# Patient Record
Sex: MALE | Race: WHITE | NOT HISPANIC OR LATINO | Employment: FULL TIME | URBAN - NONMETROPOLITAN AREA
[De-identification: names, ages, dates, MRNs, and addresses within clinical notes are randomized per-mention and may not be internally consistent; named-entity substitution may affect disease eponyms.]

---

## 2017-07-14 ENCOUNTER — OFFICE VISIT (OUTPATIENT)
Dept: FAMILY MEDICINE | Facility: OTHER | Age: 27
End: 2017-07-14
Attending: FAMILY MEDICINE
Payer: COMMERCIAL

## 2017-07-14 VITALS
TEMPERATURE: 96.9 F | BODY MASS INDEX: 33.92 KG/M2 | DIASTOLIC BLOOD PRESSURE: 86 MMHG | HEIGHT: 69 IN | SYSTOLIC BLOOD PRESSURE: 138 MMHG | HEART RATE: 68 BPM | WEIGHT: 229 LBS

## 2017-07-14 DIAGNOSIS — Z13.1 SCREENING FOR DIABETES MELLITUS: ICD-10-CM

## 2017-07-14 DIAGNOSIS — Z23 NEED FOR VACCINATION: Primary | ICD-10-CM

## 2017-07-14 DIAGNOSIS — Z13.220 LIPID SCREENING: ICD-10-CM

## 2017-07-14 DIAGNOSIS — Z71.89 ACP (ADVANCE CARE PLANNING): Chronic | ICD-10-CM

## 2017-07-14 DIAGNOSIS — Z00.00 ROUTINE GENERAL MEDICAL EXAMINATION AT A HEALTH CARE FACILITY: ICD-10-CM

## 2017-07-14 PROCEDURE — 90715 TDAP VACCINE 7 YRS/> IM: CPT | Performed by: FAMILY MEDICINE

## 2017-07-14 PROCEDURE — 99395 PREV VISIT EST AGE 18-39: CPT | Mod: 25 | Performed by: FAMILY MEDICINE

## 2017-07-14 PROCEDURE — 90471 IMMUNIZATION ADMIN: CPT | Performed by: FAMILY MEDICINE

## 2017-07-14 ASSESSMENT — PAIN SCALES - GENERAL: PAINLEVEL: NO PAIN (0)

## 2017-07-14 ASSESSMENT — ANXIETY QUESTIONNAIRES
5. BEING SO RESTLESS THAT IT IS HARD TO SIT STILL: NOT AT ALL
7. FEELING AFRAID AS IF SOMETHING AWFUL MIGHT HAPPEN: NOT AT ALL
2. NOT BEING ABLE TO STOP OR CONTROL WORRYING: NOT AT ALL
6. BECOMING EASILY ANNOYED OR IRRITABLE: NOT AT ALL
4. TROUBLE RELAXING: NOT AT ALL
1. FEELING NERVOUS, ANXIOUS, OR ON EDGE: NOT AT ALL
3. WORRYING TOO MUCH ABOUT DIFFERENT THINGS: NOT AT ALL
GAD7 TOTAL SCORE: 0

## 2017-07-14 NOTE — MR AVS SNAPSHOT
After Visit Summary   7/14/2017    Juan Blake    MRN: [id number]           Patient Information     Date Of Birth          1990        Visit Information        Provider Department      7/14/2017 9:15 AM MAURA Chan MD [facility]        Today's Diagnoses     Need for vaccination    -  1    ACP (advance care planning)        Lipid screening        Screening for diabetes mellitus        Routine general medical examination at a health care facility          Care Instructions    We will call with the labs.            Follow-ups after your visit        Your next 10 appointments already scheduled     Jul 14, 2017  9:15 AM CDT   (Arrive by 9:00 AM)   Office Visit with MAURA Chan MD   [facility] ([facility] )    [address]  [facility] [address]   [phone]           Bring a current list of meds and any records pertaining to this visit.  For Physicals, please bring immunization records and any forms needing to be filled out.    Please arrive 15 minutes early to complete paperwork and register.              Future tests that were ordered for you today     Open Future Orders        Priority Expected Expires Ordered    Lipid Profile Routine 7/24/2017 12/14/2017 7/14/2017    Glucose Routine 7/24/2017 12/14/2017 7/14/2017    CBC with platelets differential Routine 7/24/2017 12/14/2017 7/14/2017            Who to contact     If you have questions or need follow up information about today's clinic visit or your schedule please contact [facility] directly at [phone].  Normal or non-critical lab and imaging results will be communicated to you by MyChart, letter or phone within 4 business days after the clinic has received the results. If you do not hear from us within 7 days, please contact the clinic through MyChart or phone. If you have a critical or abnormal lab result, we will notify you by phone as soon as possible.  Submit  "refill requests through [employer] or call your pharmacy and they will forward the refill request to us. Please allow 3 business days for your refill to be completed.          Additional Information About Your Visit        [employer] Information     W[employer] lets you send messages to your doctor, view your test results, renew your prescriptions, schedule appointments and more. To sign up, go to [email or web address] . Click on \"Log in\" on the left side of the screen, which will take you to the Welcome page. Then click on \"Sign up Now\" on the right side of the page.     You will be asked to enter the access code listed below, as well as some personal information. Please follow the directions to create your username and password.     Your access code is: [email or web address]  Expires: 10/12/2017  9:12 AM     Your access code will  in 90 days. If you need help or a new code, please call your [place] clinic or [phone].        Care EveryWhere ID     This is your Care EveryWhere ID. This could be used by other organizations to access your [place] medical records  [id number]        Your Vitals Were     Pulse Temperature Height BMI (Body Mass Index)          68 96.9  F (36.1  C) 5' 9.25\" (1.759 m) 33.57 kg/m2         Blood Pressure from Last 3 Encounters:   17 138/86   02/24/15 122/68   02/17/15 142/89    Weight from Last 3 Encounters:   17 229 lb (103.9 kg)   02/24/15 208 lb (94.3 kg)   02/16/15 208 lb (94.3 kg)              We Performed the Following     1st  Administration  [57957]     TDAP VACCINE (ADACEL) [15115.002]          Today's Medication Changes          These changes are accurate as of: 17  9:13 AM.  If you have any questions, ask your nurse or doctor.               Stop taking these medicines if you haven't already. Please contact your care team if you have questions.     NO ACTIVE MEDICATIONS   Stopped by:  MAURA Chan MD                    Primary Care Provider Office Phone # Fax #    " Juan Gomez,  [phone]       [facility] [address]  [facility] [address]        Equal Access to Services     ROSAS MATT : Hadii aad ku hadwillianrodrigo Soliz, wamarycarmenda luqadaha, qasheldonta kaalmada kaur, reji torreyin hayaadahlia garridoashantijack vitale. So [facility] [phone].    ATENCIÓN: Si habla español, tiene a hook disposición servicios gratuitos de asistencia lingüística. Llame al [phone].    We comply with applicable federal civil rights laws and [place] laws. We do not discriminate on the basis of race, color, national origin, age, disability sex, sexual orientation or gender identity.            Thank you!     Thank you for choosing [facility]  for your care. Our goal is always to provide you with excellent care. Hearing back from our patients is one way we can continue to improve our services. Please take a few minutes to complete the written survey that you may receive in the mail after your visit with us. Thank you!             Your Updated Medication List - Protect others around you: Learn how to safely use, store and throw away your medicines at www.disposemymeds.org.          This list is accurate as of: 7/14/17  9:13 AM.  Always use your most recent med list.                   Brand Name Dispense Instructions for use Diagnosis    IBUPROFEN PO      Take 800 mg by mouth every 8 hours as needed for moderate pain

## 2017-07-14 NOTE — PROGRESS NOTES
SUBJECTIVE:                                                    Juan Blake is a 26 year old male who presents to clinic today for the following health issues:    New Patient/Transfer of Care    Gastrointestinal symptoms      Duration: 2 years    Description:           REFLUX SYMPTOMS - heartburn    Intensity:  moderate    Accompanying signs and symptoms:  none    History  Previous {similar problem: no   Previous evaluation:  none    Aggravating factors: spicy foods, coffee    Alleviating factors: avoidance of caffeine and antacids    Other Therapies tried: zantac and tums    Groin pain      Duration: 1 month    Description (location/character/radiation): right side    Intensity:  moderate    Accompanying signs and symptoms: none    History (similar episodes/previous evaluation): None    Precipitating or alleviating factors: running, exercise, basketball    Therapies tried and outcome: None           Problem list and histories reviewed & adjusted, as indicated.  Additional history: as documented    Patient Active Problem List   Diagnosis     Facial trauma     Nasal fracture     ACP (advance care planning)     Past Surgical History:   Procedure Laterality Date     C ORAL SURGERY PROCEDURE      wisdom teeth     CLOSED REDUCTION NOSE N/A 2/17/2015    Procedure: CLOSED REDUCTION NOSE;  Surgeon: Yany Storey MD;  Location: [facility] OR       Social History   Substance Use Topics     Smoking status: Never Smoker     Smokeless tobacco: Never Used     Alcohol use 7.2 oz/week     12 Cans of beer per week      Comment: socially     Family History   Problem Relation Age of Onset     DIABETES Maternal Grandfather      Irritable Bowel Syndrome Mother      Alcoholism Brother          Current Outpatient Prescriptions   Medication Sig Dispense Refill     IBUPROFEN PO Take 800 mg by mouth every 8 hours as needed for moderate pain       Allergies   Allergen Reactions     Rad Marksd [Loracarbef]      Penicillins  "       ROS:  Constitutional, HEENT, cardiovascular, pulmonary, GI, , musculoskeletal, neuro, skin, endocrine and psych systems are negative, except as otherwise noted.    OBJECTIVE:     /86  Pulse 68  Temp 96.9  F (36.1  C)  Ht 5' 9.25\" (1.759 m)  Wt 229 lb (103.9 kg)  BMI 33.57 kg/m2  Body mass index is 33.57 kg/(m^2).  GENERAL: healthy, alert and no distress  EYES: Eyes grossly normal to inspection, PERRL and conjunctivae and sclerae normal  HENT: ear canals and TM's normal, nose and mouth without ulcers or lesions  NECK: no adenopathy, no asymmetry, masses, or scars and thyroid normal to palpation  RESP: lungs clear to auscultation - no rales, rhonchi or wheezes  CV: regular rate and rhythm, normal S1 S2, no S3 or S4, no murmur, click or rub, no peripheral edema and peripheral pulses strong  ABDOMEN: soft, nontender, no hepatosplenomegaly, no masses and bowel sounds normal  : normal testicles normal penis, no  inguinal adenopathy  MS: no gross musculoskeletal defects noted, no edema  SKIN: no suspicious lesions or rashes  NEURO: Normal strength and tone, mentation intact and speech normal  PSYCH: mentation appears normal, affect normal/bright    Diagnostic Test Results:  none     ASSESSMENT/PLAN:               ICD-10-CM    1. Need for vaccination Z23 TDAP VACCINE (ADACEL) [01570.002]     1st  Administration  [48569]   2. ACP (advance care planning) Z71.89    3. Lipid screening Z13.220 Lipid Profile, glucose CBC will be done the week of July 24.  Discussed diet and exercise for weight reduction. Had some reflux symptoms.  Discussed dietary changes.  Follow up as needed.  Also probably has a right groin strain no evidence of hernia.  He states is actually feeling better now.  He will try to properly stretch prior to doing activ   4. Screening for diabetes mellitus Z13.1 Glucose   5. Routine general medical examination at a health care facility Z00.00 CBC with platelets differential           R Sidney " MD Rocio  [facility]

## 2017-07-14 NOTE — NURSING NOTE
"Chief Complaint   Patient presents with     Establish Care       Initial /86  Pulse 68  Temp 96.9  F (36.1  C)  Ht 5' 9.25\" (1.759 m)  Wt 229 lb (103.9 kg)  BMI 33.57 kg/m2 Estimated body mass index is 33.57 kg/(m^2) as calculated from the following:    Height as of this encounter: 5' 9.25\" (1.759 m).    Weight as of this encounter: 229 lb (103.9 kg).  Medication Reconciliation: complete   Cass Craig    "

## 2017-07-15 ASSESSMENT — PATIENT HEALTH QUESTIONNAIRE - PHQ9: SUM OF ALL RESPONSES TO PHQ QUESTIONS 1-9: 0

## 2017-07-15 ASSESSMENT — ANXIETY QUESTIONNAIRES: GAD7 TOTAL SCORE: 0

## 2017-07-24 DIAGNOSIS — Z13.220 LIPID SCREENING: ICD-10-CM

## 2017-07-24 DIAGNOSIS — Z00.00 ROUTINE GENERAL MEDICAL EXAMINATION AT A HEALTH CARE FACILITY: ICD-10-CM

## 2017-07-24 DIAGNOSIS — Z13.1 SCREENING FOR DIABETES MELLITUS: ICD-10-CM

## 2017-07-24 LAB
BASOPHILS # BLD AUTO: 0.1 10E9/L (ref 0–0.2)
BASOPHILS NFR BLD AUTO: 0.6 %
CHOLEST SERPL-MCNC: 240 MG/DL
DIFFERENTIAL METHOD BLD: ABNORMAL
EOSINOPHIL # BLD AUTO: 0.3 10E9/L (ref 0–0.7)
EOSINOPHIL NFR BLD AUTO: 4.1 %
ERYTHROCYTE [DISTWIDTH] IN BLOOD BY AUTOMATED COUNT: 12 % (ref 10–15)
GLUCOSE SERPL-MCNC: 97 MG/DL (ref 70–99)
HCT VFR BLD AUTO: 43.1 % (ref 40–53)
HDLC SERPL-MCNC: 36 MG/DL
HGB BLD-MCNC: 15.7 G/DL (ref 13.3–17.7)
IMM GRANULOCYTES # BLD: 0 10E9/L (ref 0–0.4)
IMM GRANULOCYTES NFR BLD: 0.4 %
LDLC SERPL CALC-MCNC: 151 MG/DL
LYMPHOCYTES # BLD AUTO: 3.4 10E9/L (ref 0.8–5.3)
LYMPHOCYTES NFR BLD AUTO: 43.8 %
MCH RBC QN AUTO: 33.1 PG (ref 26.5–33)
MCHC RBC AUTO-ENTMCNC: 36.4 G/DL (ref 31.5–36.5)
MCV RBC AUTO: 91 FL (ref 78–100)
MONOCYTES # BLD AUTO: 0.5 10E9/L (ref 0–1.3)
MONOCYTES NFR BLD AUTO: 6.5 %
NEUTROPHILS # BLD AUTO: 3.4 10E9/L (ref 1.6–8.3)
NEUTROPHILS NFR BLD AUTO: 44.6 %
NONHDLC SERPL-MCNC: 204 MG/DL
NRBC # BLD AUTO: 0 10*3/UL
NRBC BLD AUTO-RTO: 0 /100
PLATELET # BLD AUTO: 192 10E9/L (ref 150–450)
RBC # BLD AUTO: 4.74 10E12/L (ref 4.4–5.9)
TRIGL SERPL-MCNC: 264 MG/DL
WBC # BLD AUTO: 7.7 10E9/L (ref 4–11)

## 2017-07-24 PROCEDURE — 36415 COLL VENOUS BLD VENIPUNCTURE: CPT | Performed by: FAMILY MEDICINE

## 2017-07-24 PROCEDURE — 80061 LIPID PANEL: CPT | Performed by: FAMILY MEDICINE

## 2017-07-24 PROCEDURE — 85025 COMPLETE CBC W/AUTO DIFF WBC: CPT | Performed by: FAMILY MEDICINE

## 2017-07-24 PROCEDURE — 82947 ASSAY GLUCOSE BLOOD QUANT: CPT | Performed by: FAMILY MEDICINE

## 2024-10-03 NOTE — PROGRESS NOTES
"  Assessment & Plan     Encounter to establish care  - Previously followed with Dr. Chan, no recent primary care  - Reviewed medical histories, medication list and updated in epic  - Short interval follow-up for annual physical/baseline labs    Right groin pain  Intermittent but longstanding focal right groin pain without clear inciting injury.  Not debilitating but also not resolving, frequently flares with overuse or flexion/adduction of right hip.  Denies prior injuries.  Per chart review did discuss similar symptoms with Dr. Chan in 2017.  Fits fairly classically with sports hernia, although also considering other underlying anatomic variation of right hip/pelvis given duration of symptoms.  Discussed rest and ongoing monitoring versus MRI.  Will hold off on further workup for now but will reach out if symptoms change or worsen.  - Consider PT and/or MRI  - Follow-up as needed    BMI  Estimated body mass index is 33.94 kg/m  as calculated from the following:    Height as of this encounter: 1.759 m (5' 9.25\").    Weight as of this encounter: 105 kg (231 lb 8 oz).   Weight management plan: Discussed healthy diet and exercise guidelines    Follow-up for annual physical.    Joe Martinez is a 34 year old, presenting for the following health issues:  Establish Care and Pain (Groin)        10/7/2024     9:44 AM   Additional Questions   Roomed by Marino Vitale LPN   Accompanied by Self         10/7/2024     9:44 AM   Patient Reported Additional Medications   Patient reports taking the following new medications None     History of Present Illness       Reason for visit:  Groin pain  Symptom onset:  More than a month  Symptoms include:  Groin pain during activity  Symptom intensity:  Mild  Symptom progression:  Staying the same  Had these symptoms before:  No  What makes it worse:  Pain gets worse with activity  What makes it better:  Rest   He is taking medications regularly.       Establish care  -Previously " "followed with Dr. Chan  -No recent primary care  -No chronic conditions or daily medications  -Would like to get up-to-date with annual physical and address groin pain as below  -Works at [employer], shift work    Concern - Groin pain   Onset: Last winter  Description: When making certain movements feel pain in Right side groin area.    Intensity: moderate  Progression of Symptoms:  same and intermittent  Accompanying Signs & Symptoms: Sharp shooting pain in groin  Previous history of similar problem: None  Precipitating factors:        Worsened by: None  Alleviating factors:        Improved by: None  Therapies tried and outcome: None    -Ongoing issue at least since last winter  -Not debilitating and not necessarily daily but now also not resolving  -Sharp shooting pain following the right groin crease and inner thigh at times  -Particularly when he is pushing off or with flexion/adduction motions of right leg  -Does seem to calm down if he is able to take it easy  -Fares back up with overuse  -Has flared up with any activity pushing off the right leg such as changing directions and basketball  -Also flared up swinging right leg at a kickball for example  -Has never had any bruising, swelling, redness  -No genital symptoms such as scrotal fullness or discomfort, no testicular changes  -No GI symptoms  -Denies any specific injuries or surgeries  -No obvious inciting event    Review of Systems  Constitutional, HEENT, cardiovascular, pulmonary, gi and gu systems are negative, except as otherwise noted.      Objective    /74   Pulse 81   Temp 97.4  F (36.3  C) (Tympanic)   Resp 16   Ht 1.759 m (5' 9.25\")   Wt 105 kg (231 lb 8 oz)   SpO2 96%   BMI 33.94 kg/m    Body mass index is 33.94 kg/m .  Physical Exam  Vitals reviewed.   Constitutional:       Appearance: Normal appearance.   HENT:      Head: Normocephalic and atraumatic.   Cardiovascular:      Rate and Rhythm: Normal rate and regular rhythm.      Heart " sounds: No murmur heard.  Pulmonary:      Effort: Pulmonary effort is normal.      Breath sounds: Normal breath sounds. No stridor. No wheezing, rhonchi or rales.   Musculoskeletal:         General: Normal range of motion.      Comments: Hip/mood lower extremities.  Full/symmetrical ROM and strength throughout all motions.  No tenderness to palpation.  Mild discomfort in right groin with flexion/adduction movement.   Skin:     General: Skin is warm and dry.   Neurological:      General: No focal deficit present.      Mental Status: He is alert and oriented to person, place, and time.   Psychiatric:         Mood and Affect: Mood normal.         Behavior: Behavior normal.        Signed Electronically by: Luis Daniel Salinas MD

## 2024-10-07 ENCOUNTER — OFFICE VISIT (OUTPATIENT)
Dept: FAMILY MEDICINE | Facility: OTHER | Age: 34
End: 2024-10-07
Attending: STUDENT IN AN ORGANIZED HEALTH CARE EDUCATION/TRAINING PROGRAM
Payer: COMMERCIAL

## 2024-10-07 VITALS
HEART RATE: 81 BPM | BODY MASS INDEX: 34.29 KG/M2 | OXYGEN SATURATION: 96 % | HEIGHT: 69 IN | RESPIRATION RATE: 16 BRPM | TEMPERATURE: 97.4 F | SYSTOLIC BLOOD PRESSURE: 130 MMHG | WEIGHT: 231.5 LBS | DIASTOLIC BLOOD PRESSURE: 74 MMHG

## 2024-10-07 DIAGNOSIS — Z76.89 ENCOUNTER TO ESTABLISH CARE: Primary | ICD-10-CM

## 2024-10-07 DIAGNOSIS — R10.31 RIGHT GROIN PAIN: ICD-10-CM

## 2024-10-07 PROBLEM — Z71.89 ACP (ADVANCE CARE PLANNING): Chronic | Status: RESOLVED | Noted: 2017-07-14 | Resolved: 2024-10-07

## 2024-10-07 PROCEDURE — 99213 OFFICE O/P EST LOW 20 MIN: CPT | Performed by: STUDENT IN AN ORGANIZED HEALTH CARE EDUCATION/TRAINING PROGRAM

## 2024-10-07 ASSESSMENT — ANXIETY QUESTIONNAIRES
4. TROUBLE RELAXING: NOT AT ALL
8. IF YOU CHECKED OFF ANY PROBLEMS, HOW DIFFICULT HAVE THESE MADE IT FOR YOU TO DO YOUR WORK, TAKE CARE OF THINGS AT HOME, OR GET ALONG WITH OTHER PEOPLE?: NOT DIFFICULT AT ALL
5. BEING SO RESTLESS THAT IT IS HARD TO SIT STILL: NOT AT ALL
6. BECOMING EASILY ANNOYED OR IRRITABLE: NOT AT ALL
GAD7 TOTAL SCORE: 0
7. FEELING AFRAID AS IF SOMETHING AWFUL MIGHT HAPPEN: NOT AT ALL
2. NOT BEING ABLE TO STOP OR CONTROL WORRYING: NOT AT ALL
3. WORRYING TOO MUCH ABOUT DIFFERENT THINGS: NOT AT ALL
1. FEELING NERVOUS, ANXIOUS, OR ON EDGE: NOT AT ALL
7. FEELING AFRAID AS IF SOMETHING AWFUL MIGHT HAPPEN: NOT AT ALL
GAD7 TOTAL SCORE: 0
IF YOU CHECKED OFF ANY PROBLEMS ON THIS QUESTIONNAIRE, HOW DIFFICULT HAVE THESE PROBLEMS MADE IT FOR YOU TO DO YOUR WORK, TAKE CARE OF THINGS AT HOME, OR GET ALONG WITH OTHER PEOPLE: NOT DIFFICULT AT ALL
GAD7 TOTAL SCORE: 0

## 2024-10-07 ASSESSMENT — PAIN SCALES - GENERAL: PAINLEVEL: NO PAIN (0)

## 2024-10-07 ASSESSMENT — PATIENT HEALTH QUESTIONNAIRE - PHQ9
10. IF YOU CHECKED OFF ANY PROBLEMS, HOW DIFFICULT HAVE THESE PROBLEMS MADE IT FOR YOU TO DO YOUR WORK, TAKE CARE OF THINGS AT HOME, OR GET ALONG WITH OTHER PEOPLE: NOT DIFFICULT AT ALL
SUM OF ALL RESPONSES TO PHQ QUESTIONS 1-9: 0
SUM OF ALL RESPONSES TO PHQ QUESTIONS 1-9: 0

## 2024-11-08 ENCOUNTER — OFFICE VISIT (OUTPATIENT)
Dept: FAMILY MEDICINE | Facility: OTHER | Age: 34
End: 2024-11-08
Attending: STUDENT IN AN ORGANIZED HEALTH CARE EDUCATION/TRAINING PROGRAM
Payer: COMMERCIAL

## 2024-11-08 VITALS
RESPIRATION RATE: 18 BRPM | HEIGHT: 69 IN | BODY MASS INDEX: 34.21 KG/M2 | WEIGHT: 231 LBS | HEART RATE: 86 BPM | TEMPERATURE: 97.5 F | DIASTOLIC BLOOD PRESSURE: 88 MMHG | SYSTOLIC BLOOD PRESSURE: 140 MMHG | OXYGEN SATURATION: 96 %

## 2024-11-08 DIAGNOSIS — Z13.220 SCREENING FOR HYPERLIPIDEMIA: ICD-10-CM

## 2024-11-08 DIAGNOSIS — R03.0 ELEVATED BP WITHOUT DIAGNOSIS OF HYPERTENSION: ICD-10-CM

## 2024-11-08 DIAGNOSIS — Z00.00 HEALTHCARE MAINTENANCE: ICD-10-CM

## 2024-11-08 DIAGNOSIS — Z13.1 SCREENING FOR DIABETES MELLITUS: ICD-10-CM

## 2024-11-08 DIAGNOSIS — Z00.00 ROUTINE GENERAL MEDICAL EXAMINATION AT A HEALTH CARE FACILITY: Primary | ICD-10-CM

## 2024-11-08 DIAGNOSIS — Z11.9 SCREENING EXAMINATION FOR INFECTIOUS DISEASE: ICD-10-CM

## 2024-11-08 LAB
ANION GAP SERPL CALCULATED.3IONS-SCNC: 12 MMOL/L (ref 7–15)
BASOPHILS # BLD AUTO: 0 10E3/UL (ref 0–0.2)
BASOPHILS NFR BLD AUTO: 1 %
BUN SERPL-MCNC: 12.7 MG/DL (ref 6–20)
CALCIUM SERPL-MCNC: 9.3 MG/DL (ref 8.8–10.4)
CHLORIDE SERPL-SCNC: 103 MMOL/L (ref 98–107)
CHOLEST SERPL-MCNC: 219 MG/DL
CREAT SERPL-MCNC: 1.13 MG/DL (ref 0.67–1.17)
EGFRCR SERPLBLD CKD-EPI 2021: 86 ML/MIN/1.73M2
EOSINOPHIL # BLD AUTO: 0.1 10E3/UL (ref 0–0.7)
EOSINOPHIL NFR BLD AUTO: 2 %
ERYTHROCYTE [DISTWIDTH] IN BLOOD BY AUTOMATED COUNT: 11.8 % (ref 10–15)
EST. AVERAGE GLUCOSE BLD GHB EST-MCNC: 100 MG/DL
FASTING STATUS PATIENT QL REPORTED: NO
FASTING STATUS PATIENT QL REPORTED: NO
GLUCOSE SERPL-MCNC: 102 MG/DL (ref 70–99)
HBA1C MFR BLD: 5.1 %
HCO3 SERPL-SCNC: 23 MMOL/L (ref 22–29)
HCT VFR BLD AUTO: 41.4 % (ref 40–53)
HDLC SERPL-MCNC: 33 MG/DL
HGB BLD-MCNC: 15 G/DL (ref 13.3–17.7)
HIV 1+2 AB+HIV1 P24 AG SERPL QL IA: NONREACTIVE
IMM GRANULOCYTES # BLD: 0 10E3/UL
IMM GRANULOCYTES NFR BLD: 0 %
LDLC SERPL CALC-MCNC: 138 MG/DL
LYMPHOCYTES # BLD AUTO: 2.1 10E3/UL (ref 0.8–5.3)
LYMPHOCYTES NFR BLD AUTO: 33 %
MCH RBC QN AUTO: 32.5 PG (ref 26.5–33)
MCHC RBC AUTO-ENTMCNC: 36.2 G/DL (ref 31.5–36.5)
MCV RBC AUTO: 90 FL (ref 78–100)
MONOCYTES # BLD AUTO: 0.5 10E3/UL (ref 0–1.3)
MONOCYTES NFR BLD AUTO: 7 %
NEUTROPHILS # BLD AUTO: 3.7 10E3/UL (ref 1.6–8.3)
NEUTROPHILS NFR BLD AUTO: 57 %
NONHDLC SERPL-MCNC: 186 MG/DL
NRBC # BLD AUTO: 0 10E3/UL
NRBC BLD AUTO-RTO: 0 /100
PLATELET # BLD AUTO: 177 10E3/UL (ref 150–450)
POTASSIUM SERPL-SCNC: 4 MMOL/L (ref 3.4–5.3)
RBC # BLD AUTO: 4.62 10E6/UL (ref 4.4–5.9)
SODIUM SERPL-SCNC: 138 MMOL/L (ref 135–145)
TRIGL SERPL-MCNC: 239 MG/DL
WBC # BLD AUTO: 6.4 10E3/UL (ref 4–11)

## 2024-11-08 PROCEDURE — 85025 COMPLETE CBC W/AUTO DIFF WBC: CPT | Performed by: STUDENT IN AN ORGANIZED HEALTH CARE EDUCATION/TRAINING PROGRAM

## 2024-11-08 PROCEDURE — 80048 BASIC METABOLIC PNL TOTAL CA: CPT | Performed by: STUDENT IN AN ORGANIZED HEALTH CARE EDUCATION/TRAINING PROGRAM

## 2024-11-08 PROCEDURE — 80061 LIPID PANEL: CPT | Performed by: STUDENT IN AN ORGANIZED HEALTH CARE EDUCATION/TRAINING PROGRAM

## 2024-11-08 PROCEDURE — 86803 HEPATITIS C AB TEST: CPT | Performed by: STUDENT IN AN ORGANIZED HEALTH CARE EDUCATION/TRAINING PROGRAM

## 2024-11-08 PROCEDURE — 83036 HEMOGLOBIN GLYCOSYLATED A1C: CPT | Performed by: STUDENT IN AN ORGANIZED HEALTH CARE EDUCATION/TRAINING PROGRAM

## 2024-11-08 PROCEDURE — 87389 HIV-1 AG W/HIV-1&-2 AB AG IA: CPT | Performed by: STUDENT IN AN ORGANIZED HEALTH CARE EDUCATION/TRAINING PROGRAM

## 2024-11-08 PROCEDURE — 99395 PREV VISIT EST AGE 18-39: CPT | Performed by: STUDENT IN AN ORGANIZED HEALTH CARE EDUCATION/TRAINING PROGRAM

## 2024-11-08 PROCEDURE — 36415 COLL VENOUS BLD VENIPUNCTURE: CPT | Performed by: STUDENT IN AN ORGANIZED HEALTH CARE EDUCATION/TRAINING PROGRAM

## 2024-11-08 SDOH — HEALTH STABILITY: PHYSICAL HEALTH: ON AVERAGE, HOW MANY DAYS PER WEEK DO YOU ENGAGE IN MODERATE TO STRENUOUS EXERCISE (LIKE A BRISK WALK)?: 4 DAYS

## 2024-11-08 ASSESSMENT — SOCIAL DETERMINANTS OF HEALTH (SDOH): HOW OFTEN DO YOU GET TOGETHER WITH FRIENDS OR RELATIVES?: THREE TIMES A WEEK

## 2024-11-08 ASSESSMENT — PAIN SCALES - GENERAL: PAINLEVEL_OUTOF10: NO PAIN (0)

## 2024-11-08 NOTE — PROGRESS NOTES
Preventive Care Visit  [facility]  Luis Daniel Salinas MD, Family Medicine  Nov 8, 2024      Assessment & Plan     Routine general medical examination at a health care facility  - Hepatitis C Screen Reflex to HCV RNA Quant and Genotype  - HIV Antigen Antibody Combo  - Lipid Profile (Chol, Trig, HDL, LDL calc)  - Hemoglobin A1c  - Basic metabolic panel  - CBC with platelets and differential  - Preventative screening guidelines provided in AVS  - Return 1 year for annual physical    Healthcare maintenance  - BP/vitals: BP elevated, see below.  Remainder vitals normal.  - BMI: Body mass index is 33.87 kg/m .; discussed healthy diet and excise recommendations  - ASCVD risk screening: Annual A1c/lipid screen.  Discussed risk mitigation including indications for ASA/statin therapy.   The ASCVD Risk score (Faye DK, et al., 2019) failed to calculate for the following reasons:    The 2019 ASCVD risk score is only valid for ages 40 to 79  - Mood: Denies concerns.      10/7/2024     9:39 AM   PHQ-2 ( 1999 Pfizer)   Q1: Little interest or pleasure in doing things 0    Q2: Feeling down, depressed or hopeless 0    PHQ-2 Score 0   Q1: Little interest or pleasure in doing things Not at all   Q2: Feeling down, depressed or hopeless Not at all   PHQ-2 Score 0       Patient-reported   - Tobacco/substance screening: No tobacco illicit substance use     Tobacco Use      Smoking status: Never        Passive exposure: Never      Smokeless tobacco: Never  - Infectious disease screening: Low risk; baseline screen as below  - Cancer screening:              -Family history: No high risk history   -Lung: n/a   -Colon: Reviewed red flag symptoms otherwise screening starting at age 45   -Prostate: Reviewed red flag symptoms otherwise screening starting age 50  - Immunizations: Declines flu, COVID    Screening for diabetes mellitus  - Hemoglobin A1c    Screening for hyperlipidemia  - Lipid Profile (Chol, Trig, HDL, LDL calc)    Screening  examination for infectious disease  - Hepatitis C Screen Reflex to HCV RNA Quant and Genotype  - HIV Antigen Antibody Combo    Elevated BP without diagnosis of hypertension  Notably elevated on initial check, borderline on recheck.  No prior history of hypertensive disease.  Nice discussion about lifestyle management as well as occasional home BP monitoring, will reach out if BP is persistently above goal.  - Discussed healthy diet and exercise habits  - DASH diet handout provided in AVS  - Able to do home BP monitoring occasionally  - Lab screening as above  - If diagnosis confirmed plan EKG, possible sleep study, secondary HTN workup      Patient has been advised of split billing requirements and indicates understanding: Yes      Counseling  Appropriate preventive services were addressed with this patient via screening, questionnaire, or discussion as appropriate for fall prevention, nutrition, physical activity, Tobacco-use cessation, social engagement, weight loss and cognition.  Checklist reviewing preventive services available has been given to the patient.  Reviewed patient's diet, addressing concerns and/or questions.   The patient reports drinking more than 3 alcoholic drinks per day and/or more than 7 drhnks per week. The patient was counseled and given information about possible harmful effects of excessive alcohol intake.      Return in about 53 weeks (around 11/14/2025) for Annual Wellness Visit.    Joe Martinez is a 34 year old, presenting for the following:  Physical        11/8/2024    10:21 AM   Additional Questions   Roomed by Garry Cole   Accompanied by None         11/8/2024    10:21 AM   Patient Reported Additional Medications   Patient reports taking the following new medications None          HPI    Patient states that he did not know that they were fasting labs and had 2 8oz cups of coffee with creamer.    Health Care Directive  Patient does not have a Health Care Directive:  Discussed advance care planning with patient; however, patient declined at this time.      11/8/2024   General Health   How would you rate your overall physical health? Good   Feel stress (tense, anxious, or unable to sleep) Not at all            11/8/2024   Nutrition   Three or more servings of calcium each day? Yes   Diet: Regular (no restrictions)   How many servings of fruit and vegetables per day? (!) 0-1   How many sweetened beverages each day? 0-1            11/8/2024   Exercise   Days per week of moderate/strenous exercise 4 days            11/8/2024   Social Factors   Frequency of gathering with friends or relatives Three times a week   Worry food won't last until get money to buy more No   Food not last or not have enough money for food? No   Do you have housing? (Housing is defined as stable permanent housing and does not include staying ouside in a car, in a tent, in an abandoned building, in an overnight shelter, or couch-surfing.) Yes   Are you worried about losing your housing? No   Lack of transportation? No   Unable to get utilities (heat,electricity)? No            11/8/2024   Dental   Dentist two times every year? Yes            11/8/2024   TB Screening   Were you born outside of the [place]? No              Today's PHQ-2 Score:       10/7/2024     9:39 AM   PHQ-2 ( 1999 Pfizer)   Q1: Little interest or pleasure in doing things 0    Q2: Feeling down, depressed or hopeless 0    PHQ-2 Score 0   Q1: Little interest or pleasure in doing things Not at all   Q2: Feeling down, depressed or hopeless Not at all   PHQ-2 Score 0       Patient-reported         11/8/2024   Substance Use   Alcohol more than 3/day or more than 7/wk Yes   How often do you have a drink containing alcohol 2 to 3 times a week   How many alcohol drinks on typical day 5 or 6   How often do you have 5+ drinks at one occasion Monthly   Audit 2/3 Score 4   How often not able to stop drinking once started Never   How often failed to do what  "normally expected Never   How often needed first drink in am after a heavy drinking session Never   How often feeling of guilt or remorse after drinking Never   How often unable to remember what happened the night before Never   Have you or someone else been injured because of your drinking No   Has anyone been concerned or suggested you cut down on drinking No   TOTAL SCORE - AUDIT 7   Do you use any other substances recreationally? No        Social History     Tobacco Use    Smoking status: Never     Passive exposure: Never    Smokeless tobacco: Never   Vaping Use    Vaping status: Never Used   Substance Use Topics    Alcohol use: Yes     Alcohol/week: 12.0 standard drinks of alcohol     Types: 12 Cans of beer per week     Comment: socially    Drug use: No             11/8/2024   One time HIV Screening   Previous HIV test? No          11/8/2024   STI Screening   New sexual partner(s) since last STI/HIV test? No            11/8/2024   Contraception/Family Planning   Questions about contraception or family planning No           Reviewed and updated as needed this visit by Provider                    Lab work is in process      Review of Systems  Constitutional, HEENT, cardiovascular, pulmonary, gi and gu systems are negative, except as otherwise noted.     Objective    Exam  BP (!) 140/88   Pulse 86   Temp 97.5  F (36.4  C) (Tympanic)   Resp 18   Ht 1.759 m (5' 9.25\")   Wt 104.8 kg (231 lb)   SpO2 96%   BMI 33.87 kg/m     Estimated body mass index is 33.87 kg/m  as calculated from the following:    Height as of this encounter: 1.759 m (5' 9.25\").    Weight as of this encounter: 104.8 kg (231 lb).    Physical Exam  GENERAL: alert and no distress  EYES: Eyes grossly normal to inspection, PERRL and conjunctivae and sclerae normal  HENT: ear canals and TM's normal, nose and mouth without ulcers or lesions  NECK: no adenopathy, no asymmetry, masses, or scars  RESP: lungs clear to auscultation - no rales, rhonchi " or wheezes  CV: regular rate and rhythm, normal S1 S2, no S3 or S4, no murmur, click or rub, no peripheral edema  ABDOMEN: soft, nontender, no hepatosplenomegaly, no masses and bowel sounds normal  MS: no gross musculoskeletal defects noted, no edema  SKIN: no suspicious lesions or rashes  NEURO: Normal strength and tone, mentation intact and speech normal  PSYCH: mentation appears normal, affect normal/bright        Signed Electronically by: Luis Daniel Salinas MD

## 2024-11-08 NOTE — PATIENT INSTRUCTIONS
Patient Education   Preventive Care Advice   This is general advice given by our system to help you stay healthy. However, your care team may have specific advice just for you. Please talk to your care team about your preventive care needs.  Nutrition  Eat 5 or more servings of fruits and vegetables each day.  Try wheat bread, brown rice and whole grain pasta (instead of white bread, rice, and pasta).  Get enough calcium and vitamin D. Check the label on foods and aim for 100% of the RDA (recommended daily allowance).  Lifestyle  Exercise at least 150 minutes each week  (30 minutes a day, 5 days a week).  Do muscle strengthening activities 2 days a week. These help control your weight and prevent disease.  No smoking.  Wear sunscreen to prevent skin cancer.  Have a dental exam and cleaning every 6 months.  Yearly exams  See your health care team every year to talk about:  Any changes in your health.  Any medicines your care team has prescribed.  Preventive care, family planning, and ways to prevent chronic diseases.  Shots (vaccines)   HPV shots (up to age 26), if you've never had them before.  Hepatitis B shots (up to age 59), if you've never had them before.  COVID-19 shot: Get this shot when it's due.  Flu shot: Get a flu shot every year.  Tetanus shot: Get a tetanus shot every 10 years.  Pneumococcal, hepatitis A, and RSV shots: Ask your care team if you need these based on your risk.  Shingles shot (for age 50 and up)  General health tests  Diabetes screening:  Starting at age 35, Get screened for diabetes at least every 3 years.  If you are younger than age 35, ask your care team if you should be screened for diabetes.  Cholesterol test: At age 39, start having a cholesterol test every 5 years, or more often if advised.  Bone density scan (DEXA): At age 50, ask your care team if you should have this scan for osteoporosis (brittle bones).  Hepatitis C: Get tested at least once in your life.  STIs (sexually  transmitted infections)  Before age 24: Ask your care team if you should be screened for STIs.  After age 24: Get screened for STIs if you're at risk. You are at risk for STIs (including HIV) if:  You are sexually active with more than one person.  You don't use condoms every time.  You or a partner was diagnosed with a sexually transmitted infection.  If you are at risk for HIV, ask about PrEP medicine to prevent HIV.  Get tested for HIV at least once in your life, whether you are at risk for HIV or not.  Cancer screening tests  Cervical cancer screening: If you have a cervix, begin getting regular cervical cancer screening tests starting at age 21.  Breast cancer scan (mammogram): If you've ever had breasts, begin having regular mammograms starting at age 40. This is a scan to check for breast cancer.  Colon cancer screening: It is important to start screening for colon cancer at age 45.  Have a colonoscopy test every 10 years (or more often if you're at risk) Or, ask your provider about stool tests like a FIT test every year or Cologuard test every 3 years.  To learn more about your testing options, visit:   .  For help making a decision, visit:   https://bit.ly/iu62804.  Prostate cancer screening test: If you have a prostate, ask your care team if a prostate cancer screening test (PSA) at age 55 is right for you.  Lung cancer screening: If you are a current or former smoker ages 50 to 80, ask your care team if ongoing lung cancer screenings are right for you.  For informational purposes only. Not to replace the advice of your health care provider. Copyright   2023 [place]. All rights reserved. Clinically reviewed by the [facility]itions [facility]. [employer] [id number] - REV 01/24.

## 2024-11-09 LAB — HCV AB SERPL QL IA: NONREACTIVE

## 2025-05-13 ENCOUNTER — OFFICE VISIT (OUTPATIENT)
Dept: UROLOGY | Facility: OTHER | Age: 35
End: 2025-05-13
Attending: UROLOGY
Payer: COMMERCIAL

## 2025-05-13 VITALS — HEART RATE: 96 BPM | SYSTOLIC BLOOD PRESSURE: 150 MMHG | DIASTOLIC BLOOD PRESSURE: 100 MMHG | OXYGEN SATURATION: 97 %

## 2025-05-13 DIAGNOSIS — Z30.2 ENCOUNTER FOR VASECTOMY: ICD-10-CM

## 2025-05-13 ASSESSMENT — ANXIETY QUESTIONNAIRES
4. TROUBLE RELAXING: NOT AT ALL
5. BEING SO RESTLESS THAT IT IS HARD TO SIT STILL: NOT AT ALL
6. BECOMING EASILY ANNOYED OR IRRITABLE: NOT AT ALL
GAD7 TOTAL SCORE: 0
IF YOU CHECKED OFF ANY PROBLEMS ON THIS QUESTIONNAIRE, HOW DIFFICULT HAVE THESE PROBLEMS MADE IT FOR YOU TO DO YOUR WORK, TAKE CARE OF THINGS AT HOME, OR GET ALONG WITH OTHER PEOPLE: NOT DIFFICULT AT ALL
7. FEELING AFRAID AS IF SOMETHING AWFUL MIGHT HAPPEN: NOT AT ALL
2. NOT BEING ABLE TO STOP OR CONTROL WORRYING: NOT AT ALL
3. WORRYING TOO MUCH ABOUT DIFFERENT THINGS: NOT AT ALL
1. FEELING NERVOUS, ANXIOUS, OR ON EDGE: NOT AT ALL
GAD7 TOTAL SCORE: 0

## 2025-05-13 ASSESSMENT — PAIN SCALES - GENERAL: PAINLEVEL_OUTOF10: NO PAIN (0)

## 2025-05-13 NOTE — PROGRESS NOTES
Juan Blake is a 34 year old gentleman seen today to discuss a possible vasectomy.    The patient has children.  3 months up to 7 years.  Currently using condoms for contraception.  He denies prior scrotal surgery or significant trauma.  He denies any current symptoms of pain, swelling, tenderness, masses.  He does not take any blood thinners.    Complete review of systems obtained.  Pertinent positives and negatives in HPI.    Past Medical History:   Diagnosis Date    Facial trauma 02/16/2015    Nasal fracture 02/16/2015    NO ACTIVE PROBLEMS      Past Surgical History:   Procedure Laterality Date    CLOSED REDUCTION NOSE N/A 2/17/2015    Procedure: CLOSED REDUCTION NOSE;  Surgeon: Yany Storey MD;  Location: [facility] OR    [facility] ORAL SURGERY PROCEDURE      wisdom teeth     Current Outpatient Medications   Medication Sig Dispense Refill    IBUPROFEN PO Take 800 mg by mouth every 8 hours as needed for moderate pain.       No current facility-administered medications for this visit.        Allergies   Allergen Reactions    Amoxicillin-Pot Clavulanate Hives    Lorabid [Loracarbef]     Penicillins        Exam:   BP (!) 150/100 (BP Location: Right arm, Patient Position: Sitting, Cuff Size: Adult Regular)   Pulse 96   SpO2 97%     Gen: Pleasant, NAD  : Normal penis with orthotopic meatus.  No shaft lesions or plaques.  Scrotum is normal.  Bilateral testicles are normal size and consistency without masses or tenderness.  Epididymis normal bilaterally.  Spermatic cords normal with easily palpable vas deferens bilaterally.  No hernias.    Assessment and Plan:  34 year old gentleman presenting to discuss a vasectomy.    We reviewed a vasectomy in detail.  We discussed that it is a permanent form of sterilization.  There are reversal methods available, but they are expensive and not always effective.  We reviewed relevant anatomy and the technical aspects of the procedure.  We discussed local anesthetic, sedation.  He  was counseled on risks of bleeding, infection, post-procedural pain that can rarely persist.  We discussed activity limitations following the procedure, as well as the recommendation to refrain from ejaculation for 7 days.  He was told of the need for an alternative form of contraception until a postvasectomy semen analysis demonstrates no sperm.  Post-vasectomy semen analysis is generally completed around 3 months after the procedure.  The patient was counseled that after his semen analysis demonstrates azoospermia, there is a quoted 1 in 2000 risk of achieving a pregnancy.     All questions were answered to his satisfaction.  We will work to schedule in the near future

## 2025-05-15 ENCOUNTER — OFFICE VISIT (OUTPATIENT)
Dept: UROLOGY | Facility: OTHER | Age: 35
End: 2025-05-15
Attending: NURSE PRACTITIONER
Payer: COMMERCIAL

## 2025-05-15 ENCOUNTER — PREP FOR PROCEDURE (OUTPATIENT)
Dept: UROLOGY | Facility: OTHER | Age: 35
End: 2025-05-15

## 2025-05-15 VITALS — DIASTOLIC BLOOD PRESSURE: 120 MMHG | HEART RATE: 76 BPM | SYSTOLIC BLOOD PRESSURE: 156 MMHG | OXYGEN SATURATION: 98 %

## 2025-05-15 DIAGNOSIS — Z01.818 PREOP GENERAL PHYSICAL EXAM: Primary | ICD-10-CM

## 2025-05-15 DIAGNOSIS — Z30.2 REQUEST FOR STERILIZATION: Primary | ICD-10-CM

## 2025-05-15 DIAGNOSIS — R03.0 ELEVATED BP WITHOUT DIAGNOSIS OF HYPERTENSION: ICD-10-CM

## 2025-05-15 LAB
ANION GAP SERPL CALCULATED.3IONS-SCNC: 9 MMOL/L (ref 7–15)
ATRIAL RATE - MUSE: 70 BPM
BASOPHILS # BLD AUTO: 0 10E3/UL (ref 0–0.2)
BASOPHILS NFR BLD AUTO: 1 %
BUN SERPL-MCNC: 11.1 MG/DL (ref 6–20)
CALCIUM SERPL-MCNC: 9.3 MG/DL (ref 8.8–10.4)
CHLORIDE SERPL-SCNC: 106 MMOL/L (ref 98–107)
CREAT SERPL-MCNC: 1.1 MG/DL (ref 0.67–1.17)
DIASTOLIC BLOOD PRESSURE - MUSE: NORMAL MMHG
EGFRCR SERPLBLD CKD-EPI 2021: 89 ML/MIN/1.73M2
EOSINOPHIL # BLD AUTO: 0.2 10E3/UL (ref 0–0.7)
EOSINOPHIL NFR BLD AUTO: 3 %
ERYTHROCYTE [DISTWIDTH] IN BLOOD BY AUTOMATED COUNT: 12.4 % (ref 10–15)
GLUCOSE SERPL-MCNC: 110 MG/DL (ref 70–99)
HCO3 SERPL-SCNC: 26 MMOL/L (ref 22–29)
HCT VFR BLD AUTO: 42.9 % (ref 40–53)
HGB BLD-MCNC: 15.2 G/DL (ref 13.3–17.7)
IMM GRANULOCYTES # BLD: 0 10E3/UL
IMM GRANULOCYTES NFR BLD: 0 %
INTERPRETATION ECG - MUSE: NORMAL
LYMPHOCYTES # BLD AUTO: 2.4 10E3/UL (ref 0.8–5.3)
LYMPHOCYTES NFR BLD AUTO: 33 %
MCH RBC QN AUTO: 32.2 PG (ref 26.5–33)
MCHC RBC AUTO-ENTMCNC: 35.4 G/DL (ref 31.5–36.5)
MCV RBC AUTO: 91 FL (ref 78–100)
MONOCYTES # BLD AUTO: 0.5 10E3/UL (ref 0–1.3)
MONOCYTES NFR BLD AUTO: 7 %
NEUTROPHILS # BLD AUTO: 4 10E3/UL (ref 1.6–8.3)
NEUTROPHILS NFR BLD AUTO: 56 %
NRBC # BLD AUTO: 0 10E3/UL
NRBC BLD AUTO-RTO: 0 /100
P AXIS - MUSE: 13 DEGREES
PLATELET # BLD AUTO: 217 10E3/UL (ref 150–450)
POTASSIUM SERPL-SCNC: 4.1 MMOL/L (ref 3.4–5.3)
PR INTERVAL - MUSE: 156 MS
QRS DURATION - MUSE: 88 MS
QT - MUSE: 390 MS
QTC - MUSE: 421 MS
R AXIS - MUSE: 16 DEGREES
RBC # BLD AUTO: 4.72 10E6/UL (ref 4.4–5.9)
SODIUM SERPL-SCNC: 141 MMOL/L (ref 135–145)
SYSTOLIC BLOOD PRESSURE - MUSE: NORMAL MMHG
T AXIS - MUSE: 23 DEGREES
VENTRICULAR RATE- MUSE: 70 BPM
WBC # BLD AUTO: 7.2 10E3/UL (ref 4–11)

## 2025-05-15 ASSESSMENT — PAIN SCALES - GENERAL: PAINLEVEL_OUTOF10: NO PAIN (0)

## 2025-05-15 NOTE — NURSING NOTE
Pt in today for pre-op with Kelli Sharma NP, his initial BP was 150/110. Pt stated he feels fine, pulse wnl, no other s/s of hypertension. Rechecked at end of pre-op, still 150/110, pt will have EKG done today as well. Advised pt of risks associated with high BP and coordinated with Dr. Lopez RN coordinator to schedule pt for BP follow-up.    Thank you,     Dimple GRAF BSN, PHN  RN Care Coordinator Urology  [facility]

## 2025-05-15 NOTE — PROGRESS NOTES
Vasectomy scheduled for 6/9/2025 with Dr. Van   Pre-op education provided  Pre-op with Kelli Sharma 5/15/2025  Will see Dr. Marino before Surgery as well    Thank you,     Dimple GRAF, BSN, PHN  RN Care Coordinator Urology  [facility]

## 2025-05-15 NOTE — PROGRESS NOTES
Preoperative Evaluation  [facility] [place]  [address]  [place] [address]  Phone: [phone]  Primary Provider: Luis Daniel Salinas MD  Pre-op Performing Provider: SYDNI Alicea CNP  May 15, 2025         5/14/2025   Surgical Information   What procedure is being done? Vasectomy   Facility or Hospital where procedure/surgery will be performed: [place]   Who is doing the procedure / surgery? Dr. Van   Date of surgery / procedure: 6/9/2025   Time of surgery / procedure: NA   Where do you plan to recover after surgery? at home with family     Fax number for surgical facility: Note does not need to be faxed, will be available electronically in Epic.    Assessment & Plan     The proposed surgical procedure is considered LOW risk.    Preop general physical exam  Patient is set up for a vasectomy with Dr Carie Van on 6/9/25. Surgery education completed. Patient aware he will need a  after surgery.   Patient has been cleared for surgery pending follow up with primary care provider for evaluation of elevated blood pressure.   CBC  BMP  EKG    Elevated BP without diagnosis of hypertension  Patient does have an elevated blood pressure today of 150/110.  It was elevated at his visit with urology earlier this week. He denies chest pain, palpitations. Heart rate is regular. He is asymptomatic. Patient has had an elevated blood pressure in the past. He feels this is related to anxiety when he comes in to the clinic.  When patient was last seen by primary care, he was advised to check his blood pressures at home, he has not done this. Patient was instructed to check his blood pressures at home and document readings. If BP remains elevated as it is at visit today, he was advised to be seen in the ER for cardiac work up. He will otherwise see his primary care provider for follow up of elevated blood pressure  EKG done today      - No identified additional risk factors other than  previously addressed       Recommendation  Approval given, Patient will see primary provider before surgery for evaluation of elevated blood pressure      Joe Martinez is a 34 year old, presenting for the following:  request for sterilization    HPI: Patient here for preoperative physical for planned vasectomy with Dr Van. Patient was seen for vasectomy consult on 5/13/25. Patient reports family is complete, he has 4 children. Patient has not had any prior  scrotal surgery or significant trauma.  He denies any scrotal pain, swelling, tenderness, masses. Patient takes occasional ibuprofen, he does not take any prescription blood thinners. He is not on any prescription medication.     Patient does have an elevated blood pressure today. He denies chest pain, palpitations. He denies headaches. He is asymptomatic. Patient has had an elevated blood pressure in the past. He feels this is related to anxiety when he comes in to the clinic. It was elevated at his visit with urology earlier this week. When patient was last seen by primary care, he was advised to check his blood pressures at home, he has not done this.   Patient is otherwise feeling well and has no concerns at this time.            5/14/2025   Pre-Op Questionnaire   Have you ever had a heart attack or stroke? No   Have you ever had surgery on your heart or blood vessels, such as a stent placement, a coronary artery bypass, or surgery on an artery in your head, neck, heart, or legs? No   Do you have chest pain with activity? No   Do you have a history of heart failure? No   Do you currently have a cold, bronchitis or symptoms of other infection? No   Do you have a cough, shortness of breath, or wheezing? No   Do you or anyone in your family have previous history of blood clots? No   Do you or does anyone in your family have a serious bleeding problem such as prolonged bleeding following surgeries or cuts? No   Have you ever had problems with anemia or  been told to take iron pills? No   Have you had any abnormal blood loss such as black, tarry or bloody stools? No   Have you ever had a blood transfusion? No   Are you willing to have a blood transfusion if it is medically needed before, during, or after your surgery? Yes   Have you or any of your relatives ever had problems with anesthesia? No   Do you have sleep apnea, excessive snoring or daytime drowsiness? No   Do you have any artifical heart valves or other implanted medical devices like a pacemaker, defibrillator, or continuous glucose monitor? No   Do you have artificial joints? No   Are you allergic to latex? No     Advance Care Planning    Discussed advance care planning with patient; however, patient declined at this time.    }    Patient Active Problem List    Diagnosis Date Noted    Elevated BP without diagnosis of hypertension 11/08/2024     Priority: Medium      Past Medical History:   Diagnosis Date    Facial trauma 02/16/2015    Nasal fracture 02/16/2015    NO ACTIVE PROBLEMS      Past Surgical History:   Procedure Laterality Date    CLOSED REDUCTION NOSE N/A 2/17/2015    Procedure: CLOSED REDUCTION NOSE;  Surgeon: Yany Storey MD;  Location: [facility] ORAL SURGERY PROCEDURE      wisdom teeth     Current Outpatient Medications   Medication Sig Dispense Refill    IBUPROFEN PO Take 800 mg by mouth every 8 hours as needed for moderate pain.         Allergies   Allergen Reactions    Amoxicillin-Pot Clavulanate Hives    Lorabid [Loracarbef]     Penicillins         Social History     Tobacco Use    Smoking status: Never     Passive exposure: Never    Smokeless tobacco: Never   Substance Use Topics    Alcohol use: Yes     Alcohol/week: 12.0 standard drinks of alcohol     Types: 12 Cans of beer per week     Comment: socially       History   Drug Use No     Review Of Systems  Skin: denies new skin changes  Ears/Nose/Throat: reports occasional blood nose-this has been present since past nasal  "surgery  Respiratory: No shortness of breath, dyspnea on exertion, cough  Cardiovascular: denies chest pain or palpitations   Gastrointestinal: denies abdominal pain, no bowel changes, no nausea or vomiting  Genitourinary: denies dysuria or hematuria  Musculoskeletal: denies new bone or joint pain  Neurologic: denies headaches, no dizziness, no neuropathy  Hematologic/Lymphatic/Immunologic: denies fevers, no recent illness      Objective    BP (!) 150/110 (BP Location: Right arm, Patient Position: Sitting)   Pulse 76   SpO2 98%    Estimated body mass index is 33.87 kg/m  as calculated from the following:    Height as of 11/8/24: 1.759 m (5' 9.25\").    Weight as of 11/8/24: 104.8 kg (231 lb).  Physical Exam  GENERAL: alert and no distress  EYES: Eyes grossly normal to inspection  RESP: lungs clear to auscultation - no rales, rhonchi or wheezes  CV: regular rate and rhythm, normal S1 S2, no murmur  ABDOMEN: soft, nontender, bowel sounds normal  MS: no gross musculoskeletal defects noted, no edema  SKIN: no suspicious lesions or rashes on exposed skin  NEURO: Normal strength and tone, mentation intact and speech normal  PSYCH: mentation appears normal, affect normal/bright      Diagnostics  Lab: pending at this time.  Results will be reviewed when available.   EKG: appears normal, NSR, pending cardiology review     Revised Cardiac Risk Index (RCRI)  The patient has the following serious cardiovascular risks for perioperative complications:   - No serious cardiac risks = 0 points     RCRI Interpretation: 0 points: Class I (very low risk - 0.4% complication rate)    Forty eight minutes spent on day of encounter with time spent reviewing patient records, counseling patient regarding upcoming surgery, performing preoperative examination, discussing elevated blood pressure and the need for further evaluation, ordering diagnostics, documenting in EHR and coordination of care    Signed Electronically by: Kelli HERNANDEZ" Petroske, APRN CNP  A copy of this evaluation report is provided to the requesting physician.

## 2025-05-19 LAB
ATRIAL RATE - MUSE: 70 BPM
DIASTOLIC BLOOD PRESSURE - MUSE: NORMAL MMHG
INTERPRETATION ECG - MUSE: NORMAL
P AXIS - MUSE: 13 DEGREES
PR INTERVAL - MUSE: 156 MS
QRS DURATION - MUSE: 88 MS
QT - MUSE: 390 MS
QTC - MUSE: 421 MS
R AXIS - MUSE: 16 DEGREES
SYSTOLIC BLOOD PRESSURE - MUSE: NORMAL MMHG
T AXIS - MUSE: 23 DEGREES
VENTRICULAR RATE- MUSE: 70 BPM

## 2025-05-20 ASSESSMENT — PATIENT HEALTH QUESTIONNAIRE - PHQ9
SUM OF ALL RESPONSES TO PHQ QUESTIONS 1-9: 0
SUM OF ALL RESPONSES TO PHQ QUESTIONS 1-9: 0
10. IF YOU CHECKED OFF ANY PROBLEMS, HOW DIFFICULT HAVE THESE PROBLEMS MADE IT FOR YOU TO DO YOUR WORK, TAKE CARE OF THINGS AT HOME, OR GET ALONG WITH OTHER PEOPLE: NOT DIFFICULT AT ALL

## 2025-05-21 ENCOUNTER — OFFICE VISIT (OUTPATIENT)
Dept: FAMILY MEDICINE | Facility: OTHER | Age: 35
End: 2025-05-21
Attending: STUDENT IN AN ORGANIZED HEALTH CARE EDUCATION/TRAINING PROGRAM
Payer: COMMERCIAL

## 2025-05-21 ENCOUNTER — RESULTS FOLLOW-UP (OUTPATIENT)
Dept: FAMILY MEDICINE | Facility: OTHER | Age: 35
End: 2025-05-21

## 2025-05-21 VITALS
RESPIRATION RATE: 16 BRPM | WEIGHT: 231 LBS | OXYGEN SATURATION: 96 % | BODY MASS INDEX: 33.87 KG/M2 | TEMPERATURE: 96.3 F | SYSTOLIC BLOOD PRESSURE: 132 MMHG | DIASTOLIC BLOOD PRESSURE: 89 MMHG | HEART RATE: 84 BPM

## 2025-05-21 DIAGNOSIS — Z01.818 PRE-OPERATIVE EXAMINATION: Primary | ICD-10-CM

## 2025-05-21 DIAGNOSIS — R73.09 ELEVATED GLUCOSE: ICD-10-CM

## 2025-05-21 DIAGNOSIS — I10 BENIGN ESSENTIAL HYPERTENSION: ICD-10-CM

## 2025-05-21 DIAGNOSIS — Z13.1 SCREENING FOR DIABETES MELLITUS: ICD-10-CM

## 2025-05-21 PROBLEM — R03.0 ELEVATED BP WITHOUT DIAGNOSIS OF HYPERTENSION: Status: RESOLVED | Noted: 2024-11-08 | Resolved: 2025-05-21

## 2025-05-21 LAB
EST. AVERAGE GLUCOSE BLD GHB EST-MCNC: 100 MG/DL
HBA1C MFR BLD: 5.1 %
TSH SERPL DL<=0.005 MIU/L-ACNC: 2.31 UIU/ML (ref 0.3–4.2)

## 2025-05-21 ASSESSMENT — PAIN SCALES - GENERAL: PAINLEVEL_OUTOF10: NO PAIN (0)

## 2025-05-22 ENCOUNTER — HOSPITAL ENCOUNTER (OUTPATIENT)
Dept: ULTRASOUND IMAGING | Facility: HOSPITAL | Age: 35
End: 2025-05-22
Attending: STUDENT IN AN ORGANIZED HEALTH CARE EDUCATION/TRAINING PROGRAM
Payer: COMMERCIAL

## 2025-05-22 DIAGNOSIS — I10 BENIGN ESSENTIAL HYPERTENSION: ICD-10-CM

## 2025-05-22 PROCEDURE — 93975 VASCULAR STUDY: CPT

## 2025-05-25 LAB — RENIN PLAS-CCNC: 3 NG/ML/HR

## 2025-05-27 LAB — ALDOST/RENIN PLAS-RTO: 1.6 {RATIO} (ref 0–25)

## 2025-06-19 ENCOUNTER — ANESTHESIA EVENT (OUTPATIENT)
Dept: SURGERY | Facility: HOSPITAL | Age: 35
End: 2025-06-19
Payer: COMMERCIAL

## 2025-06-19 RX ORDER — HALOPERIDOL 5 MG/ML
2 INJECTION INTRAMUSCULAR
Status: CANCELLED | OUTPATIENT
Start: 2025-06-19

## 2025-06-19 RX ORDER — ALBUTEROL SULFATE 0.83 MG/ML
2.5 SOLUTION RESPIRATORY (INHALATION) EVERY 4 HOURS PRN
Status: CANCELLED | OUTPATIENT
Start: 2025-06-19

## 2025-06-19 RX ORDER — ONDANSETRON 2 MG/ML
4 INJECTION INTRAMUSCULAR; INTRAVENOUS EVERY 30 MIN PRN
Status: CANCELLED | OUTPATIENT
Start: 2025-06-19

## 2025-06-19 RX ORDER — SODIUM CHLORIDE, SODIUM LACTATE, POTASSIUM CHLORIDE, CALCIUM CHLORIDE 600; 310; 30; 20 MG/100ML; MG/100ML; MG/100ML; MG/100ML
INJECTION, SOLUTION INTRAVENOUS CONTINUOUS
Status: CANCELLED | OUTPATIENT
Start: 2025-06-19

## 2025-06-19 RX ORDER — FENTANYL CITRATE 50 UG/ML
25 INJECTION, SOLUTION INTRAMUSCULAR; INTRAVENOUS EVERY 5 MIN PRN
Refills: 0 | Status: CANCELLED | OUTPATIENT
Start: 2025-06-19

## 2025-06-19 RX ORDER — DIAZEPAM 10 MG/2ML
2.5 INJECTION, SOLUTION INTRAMUSCULAR; INTRAVENOUS
Status: CANCELLED | OUTPATIENT
Start: 2025-06-19

## 2025-06-19 RX ORDER — HYDROMORPHONE HYDROCHLORIDE 1 MG/ML
0.5 INJECTION, SOLUTION INTRAMUSCULAR; INTRAVENOUS; SUBCUTANEOUS EVERY 5 MIN PRN
Refills: 0 | Status: CANCELLED | OUTPATIENT
Start: 2025-06-19

## 2025-06-19 RX ORDER — NALOXONE HYDROCHLORIDE 0.4 MG/ML
0.1 INJECTION, SOLUTION INTRAMUSCULAR; INTRAVENOUS; SUBCUTANEOUS
Status: CANCELLED | OUTPATIENT
Start: 2025-06-19

## 2025-06-19 RX ORDER — FENTANYL CITRATE 50 UG/ML
50 INJECTION, SOLUTION INTRAMUSCULAR; INTRAVENOUS EVERY 5 MIN PRN
Refills: 0 | Status: CANCELLED | OUTPATIENT
Start: 2025-06-19

## 2025-06-19 RX ORDER — HYDRALAZINE HYDROCHLORIDE 20 MG/ML
2.5-5 INJECTION INTRAMUSCULAR; INTRAVENOUS EVERY 10 MIN PRN
Status: CANCELLED | OUTPATIENT
Start: 2025-06-19

## 2025-06-19 RX ORDER — DEXAMETHASONE SODIUM PHOSPHATE 10 MG/ML
4 INJECTION, SOLUTION INTRAMUSCULAR; INTRAVENOUS
Status: CANCELLED | OUTPATIENT
Start: 2025-06-19

## 2025-06-19 RX ORDER — ONDANSETRON 4 MG/1
4 TABLET, ORALLY DISINTEGRATING ORAL EVERY 30 MIN PRN
Status: CANCELLED | OUTPATIENT
Start: 2025-06-19

## 2025-06-19 RX ORDER — HYDROMORPHONE HYDROCHLORIDE 1 MG/ML
0.25 INJECTION, SOLUTION INTRAMUSCULAR; INTRAVENOUS; SUBCUTANEOUS EVERY 5 MIN PRN
Refills: 0 | Status: CANCELLED | OUTPATIENT
Start: 2025-06-19

## 2025-06-19 NOTE — ANESTHESIA PREPROCEDURE EVALUATION
Anesthesia Pre-Procedure Evaluation    Patient: Juan Blake   MRN: [id number] : 1990          Procedure : Procedure(s):  VASECTOMY         Past Medical History:   Diagnosis Date    Facial trauma 2015    Nasal fracture 2015    NO ACTIVE PROBLEMS       Past Surgical History:   Procedure Laterality Date    CLOSED REDUCTION NOSE N/A 2015    Procedure: CLOSED REDUCTION NOSE;  Surgeon: Yany Storey MD;  Location: [facility] OR    [facility] ORAL SURGERY PROCEDURE      wisdom teeth      Allergies   Allergen Reactions    Amoxicillin-Pot Clavulanate Hives    Cefadroxil Hives    Loracarbef     Penicillins       Social History     Tobacco Use    Smoking status: Never     Passive exposure: Never    Smokeless tobacco: Never   Substance Use Topics    Alcohol use: Yes     Alcohol/week: 12.0 standard drinks of alcohol     Types: 12 Cans of beer per week     Comment: 5 drinks per week      Wt Readings from Last 1 Encounters:   25 104.8 kg (231 lb)        Anesthesia Evaluation   Pt has had prior anesthetic. Type: MAC.    No history of anesthetic complications       ROS/MED HX  ENT/Pulmonary:     (+)     NADYA risk factors,  hypertension,                                 Neurologic:  - neg neurologic ROS     Cardiovascular: Comment: BP Readings from Last 3 Encounters:  25 : 132/89  05/15/25 : (!) 156/120  25 : (!) 150/100        (+)  hypertension-range: no meds (126/88 on 25)/ -   -  - -                                 Previous cardiac testing   Echo: Date: Results:    Stress Test:  Date: Results:    ECG Reviewed:  Date: 5/15/25 Results:  HR 70, NSR  Cath:  Date: Results:      METS/Exercise Tolerance: >4 METS    Hematologic:  - neg hematologic  ROS     Musculoskeletal:  - neg musculoskeletal ROS     GI/Hepatic:  - neg GI/hepatic ROS     Renal/Genitourinary:  - neg Renal ROS     Endo:     (+)               Obesity,       Psychiatric/Substance Use:  - neg psychiatric ROS     Infectious Disease:   "- neg infectious disease ROS     Malignancy:  - neg malignancy ROS     Other:  - neg other ROS            Physical Exam  Airway  Mallampati: I  Neck ROM: full  Mouth opening: >= 4 cm    Cardiovascular   Rhythm: regular  Rate: normal rate   Comments: BP Readings from Last 3 Encounters:  05/21/25 : 132/89  05/15/25 : (!) 156/120  05/13/25 : (!) 150/100      Dental   (+) Completely normal teeth      Pulmonary Breath sounds clear to auscultation        Neurological   He appears awake, alert and oriented x3.    Other Findings       OUTSIDE LABS:  CBC:   Lab Results   Component Value Date    WBC 7.2 05/15/2025    WBC 6.4 11/08/2024    HGB 15.2 05/15/2025    HGB 15.0 11/08/2024    HCT 42.9 05/15/2025    HCT 41.4 11/08/2024     05/15/2025     11/08/2024     BMP:   Lab Results   Component Value Date     05/15/2025     11/08/2024    POTASSIUM 4.1 05/15/2025    POTASSIUM 4.0 11/08/2024    CHLORIDE 106 05/15/2025    CHLORIDE 103 11/08/2024    CO2 26 05/15/2025    CO2 23 11/08/2024    BUN 11.1 05/15/2025    BUN 12.7 11/08/2024    CR 1.10 05/15/2025    CR 1.13 11/08/2024     (H) 05/15/2025     (H) 11/08/2024     COAGS: No results found for: \"PTT\", \"INR\", \"FIBR\"  POC: No results found for: \"BGM\", \"HCG\", \"HCGS\"  HEPATIC: No results found for: \"ALBUMIN\", \"PROTTOTAL\", \"ALT\", \"AST\", \"GGT\", \"ALKPHOS\", \"BILITOTAL\", \"BILIDIRECT\", \"SIDNEY\"  OTHER:   Lab Results   Component Value Date    A1C 5.1 05/21/2025    TUAN 9.3 05/15/2025    TSH 2.31 05/21/2025       Anesthesia Plan    ASA Status:  1      NPO Status: NPO Appropriate   Anesthesia Type: MAC.  Induction: intravenous.  Maintenance: TIVA.   Techniques and Equipment:       - Monitoring Plan: standard ASA monitoring     Consents    Anesthesia Plan(s) and associated risks, benefits, and realistic alternatives discussed. Questions answered and patient/representative(s) expressed understanding.     - Discussed: CRNA     - Discussed with:  Patient        "   Blood Consent:      - Discussed with: patient.     Postoperative Care         Comments:    Other Comments: Reviewed chart and 6/20 [facility] hl  Prior HP 5/21 - not cleared due to new dx mild HTN               SYDNI Rose CNP    I have reviewed the pertinent notes and labs in the chart from the past 30 days and (re)examined the patient.  Any updates or changes from those notes are reflected in this note.    Clinically Significant Risk Factors Present on Admission                   # Hypertension: Noted on problem list

## 2025-06-23 ENCOUNTER — RESULTS FOLLOW-UP (OUTPATIENT)
Dept: FAMILY MEDICINE | Facility: OTHER | Age: 35
End: 2025-06-23

## 2025-06-23 ENCOUNTER — OFFICE VISIT (OUTPATIENT)
Dept: FAMILY MEDICINE | Facility: OTHER | Age: 35
End: 2025-06-23
Attending: STUDENT IN AN ORGANIZED HEALTH CARE EDUCATION/TRAINING PROGRAM
Payer: COMMERCIAL

## 2025-06-23 ENCOUNTER — LAB (OUTPATIENT)
Dept: LAB | Facility: OTHER | Age: 35
End: 2025-06-23
Payer: COMMERCIAL

## 2025-06-23 VITALS
TEMPERATURE: 97.8 F | OXYGEN SATURATION: 97 % | DIASTOLIC BLOOD PRESSURE: 88 MMHG | RESPIRATION RATE: 20 BRPM | BODY MASS INDEX: 33.62 KG/M2 | WEIGHT: 227 LBS | HEIGHT: 69 IN | SYSTOLIC BLOOD PRESSURE: 126 MMHG | HEART RATE: 69 BPM

## 2025-06-23 DIAGNOSIS — Z30.2 ENCOUNTER FOR VASECTOMY: ICD-10-CM

## 2025-06-23 DIAGNOSIS — Z01.818 PREOP GENERAL PHYSICAL EXAM: Primary | ICD-10-CM

## 2025-06-23 DIAGNOSIS — I10 BENIGN ESSENTIAL HYPERTENSION: ICD-10-CM

## 2025-06-23 DIAGNOSIS — Z01.818 PREOP GENERAL PHYSICAL EXAM: ICD-10-CM

## 2025-06-23 LAB
ANION GAP SERPL CALCULATED.3IONS-SCNC: 10 MMOL/L (ref 7–15)
BASOPHILS # BLD AUTO: 0 10E3/UL (ref 0–0.2)
BASOPHILS NFR BLD AUTO: 1 %
BUN SERPL-MCNC: 12.2 MG/DL (ref 6–20)
CALCIUM SERPL-MCNC: 9.5 MG/DL (ref 8.8–10.4)
CHLORIDE SERPL-SCNC: 105 MMOL/L (ref 98–107)
CREAT SERPL-MCNC: 1.13 MG/DL (ref 0.67–1.17)
EGFRCR SERPLBLD CKD-EPI 2021: 86 ML/MIN/1.73M2
EOSINOPHIL # BLD AUTO: 0.2 10E3/UL (ref 0–0.7)
EOSINOPHIL NFR BLD AUTO: 3 %
ERYTHROCYTE [DISTWIDTH] IN BLOOD BY AUTOMATED COUNT: 12.2 % (ref 10–15)
GLUCOSE SERPL-MCNC: 102 MG/DL (ref 70–99)
HCO3 SERPL-SCNC: 24 MMOL/L (ref 22–29)
HCT VFR BLD AUTO: 44.5 % (ref 40–53)
HGB BLD-MCNC: 15.8 G/DL (ref 13.3–17.7)
IMM GRANULOCYTES # BLD: 0 10E3/UL
IMM GRANULOCYTES NFR BLD: 0 %
LYMPHOCYTES # BLD AUTO: 2.3 10E3/UL (ref 0.8–5.3)
LYMPHOCYTES NFR BLD AUTO: 39 %
MCH RBC QN AUTO: 32.4 PG (ref 26.5–33)
MCHC RBC AUTO-ENTMCNC: 35.5 G/DL (ref 31.5–36.5)
MCV RBC AUTO: 91 FL (ref 78–100)
MONOCYTES # BLD AUTO: 0.4 10E3/UL (ref 0–1.3)
MONOCYTES NFR BLD AUTO: 7 %
NEUTROPHILS # BLD AUTO: 3.1 10E3/UL (ref 1.6–8.3)
NEUTROPHILS NFR BLD AUTO: 51 %
NRBC # BLD AUTO: 0 10E3/UL
NRBC BLD AUTO-RTO: 0 /100
PLATELET # BLD AUTO: 194 10E3/UL (ref 150–450)
POTASSIUM SERPL-SCNC: 4.4 MMOL/L (ref 3.4–5.3)
RBC # BLD AUTO: 4.88 10E6/UL (ref 4.4–5.9)
SODIUM SERPL-SCNC: 139 MMOL/L (ref 135–145)
WBC # BLD AUTO: 6 10E3/UL (ref 4–11)

## 2025-06-23 PROCEDURE — 3074F SYST BP LT 130 MM HG: CPT | Performed by: STUDENT IN AN ORGANIZED HEALTH CARE EDUCATION/TRAINING PROGRAM

## 2025-06-23 PROCEDURE — 80048 BASIC METABOLIC PNL TOTAL CA: CPT

## 2025-06-23 PROCEDURE — 36415 COLL VENOUS BLD VENIPUNCTURE: CPT

## 2025-06-23 PROCEDURE — 1126F AMNT PAIN NOTED NONE PRSNT: CPT | Performed by: STUDENT IN AN ORGANIZED HEALTH CARE EDUCATION/TRAINING PROGRAM

## 2025-06-23 PROCEDURE — 85025 COMPLETE CBC W/AUTO DIFF WBC: CPT

## 2025-06-23 PROCEDURE — 3079F DIAST BP 80-89 MM HG: CPT | Performed by: STUDENT IN AN ORGANIZED HEALTH CARE EDUCATION/TRAINING PROGRAM

## 2025-06-23 PROCEDURE — 99213 OFFICE O/P EST LOW 20 MIN: CPT | Performed by: STUDENT IN AN ORGANIZED HEALTH CARE EDUCATION/TRAINING PROGRAM

## 2025-06-23 PROCEDURE — G2211 COMPLEX E/M VISIT ADD ON: HCPCS | Performed by: STUDENT IN AN ORGANIZED HEALTH CARE EDUCATION/TRAINING PROGRAM

## 2025-06-23 ASSESSMENT — PAIN SCALES - GENERAL: PAINLEVEL_OUTOF10: NO PAIN (0)

## 2025-06-23 NOTE — PROGRESS NOTES
Preoperative Evaluation  [facility] [place]  [address]  [place] [address]  Phone: [phone]  Primary Provider: Luis Daniel Salinas MD  Pre-op Performing Provider: Luis Daniel Salinas MD  Jun 23, 2025 6/20/2025   Surgical Information   What procedure is being done? Vesectomy   Facility or Hospital where procedure/surgery will be performed: [place]   Who is doing the procedure / surgery? Dr. Van   Date of surgery / procedure: 6/25/25   Time of surgery / procedure: NA   Where do you plan to recover after surgery? at home with family     Fax number for surgical facility: Note does not need to be faxed, will be available electronically in Epic.    Assessment & Plan     The proposed surgical procedure is considered LOW risk.    Preop general physical exam  Encounter for vasectomy  Updating preop after surgery had to be postponed due to scheduling.  No prior surgical/anesthesia complications, no recent illness.  Able to tolerate greater than 4 METS without cardiopulmonary symptoms.  Very healthy, cleared for proposed procedure.  - CBC with platelets and differential; Future  - Basic metabolic panel; Future    Benign essential hypertension  Ongoing discussion last couple months, really committed to some lifestyle changes; weight down a little bit and BP much better controlled.  Also completed secondary hypertension workup which was reassuring.  - CBC with platelets and differential; Future  - Basic metabolic panel; Future     - No identified additional risk factors other than previously addressed    Preoperative Medication Instructions  Antiplatelet or Anticoagulation Medication Instructions   - We reviewed the medication list and the patient is not on an antiplatelet or anticoagulation medications.    Additional Medication Instructions   - Herbal medications and vitamins: DO NOT TAKE 14 days prior to surgery.   - ibuprofen (Advil, Motrin): DO NOT TAKE 1 day before surgery.      Recommendation  Approval given to proceed with proposed procedure, without further diagnostic evaluation.    The longitudinal plan of care for the diagnosis(es)/condition(s) as documented were addressed during this visit. Due to the added complexity in care, I will continue to support Juan in the subsequent management and with ongoing continuity of care.    I spent a total of 20 minutes on the day of the visit.   Time spent by me today doing chart review, history and exam, documentation and further activities per the note    Subjective   Juan is a 34 year old, presenting for the following:  Pre-Op Exam        HPI:       -Procedures had to be rescheduled a couple of times  -Now preop is out of 30-day window    -No new concerns  -No prior surgical/anesthesia complications  -No recent illness  -Has been working hard on some lifestyle changes to address previously discussed hypertension  -Weight down a little bit, BP improved        6/20/2025   Pre-Op Questionnaire   Have you ever had a heart attack or stroke? No   Have you ever had surgery on your heart or blood vessels, such as a stent placement, a coronary artery bypass, or surgery on an artery in your head, neck, heart, or legs? No   Do you have chest pain with activity? No   Do you have a history of heart failure? No   Do you currently have a cold, bronchitis or symptoms of other infection? No   Do you have a cough, shortness of breath, or wheezing? No   Do you or anyone in your family have previous history of blood clots? No   Do you or does anyone in your family have a serious bleeding problem such as prolonged bleeding following surgeries or cuts? No   Have you ever had problems with anemia or been told to take iron pills? No   Have you had any abnormal blood loss such as black, tarry or bloody stools? No   Have you ever had a blood transfusion? No   Are you willing to have a blood transfusion if it is medically needed before, during, or after your surgery?  Yes   Have you or any of your relatives ever had problems with anesthesia? No   Do you have sleep apnea, excessive snoring or daytime drowsiness? No   Do you have any artifical heart valves or other implanted medical devices like a pacemaker, defibrillator, or continuous glucose monitor? No   Do you have artificial joints? No   Are you allergic to latex? No     Advance Care Planning    Discussed advance care planning with patient; however, patient declined at this time.    Preoperative Review of    reviewed - no record of controlled substances prescribed.        Patient Active Problem List    Diagnosis Date Noted    Benign essential hypertension 05/21/2025     Priority: Medium      Past Medical History:   Diagnosis Date    Facial trauma 02/16/2015    Nasal fracture 02/16/2015    NO ACTIVE PROBLEMS      Past Surgical History:   Procedure Laterality Date    CLOSED REDUCTION NOSE N/A 2/17/2015    Procedure: CLOSED REDUCTION NOSE;  Surgeon: Yany Storey MD;  Location: [facility] OR    [facility] ORAL SURGERY PROCEDURE      wisdom teeth     Current Outpatient Medications   Medication Sig Dispense Refill    IBUPROFEN PO Take 800 mg by mouth every 8 hours as needed for moderate pain.         Allergies   Allergen Reactions    Amoxicillin-Pot Clavulanate Hives    Cefadroxil Hives    Loracarbef     Penicillins         Social History     Tobacco Use    Smoking status: Never     Passive exposure: Never    Smokeless tobacco: Never   Substance Use Topics    Alcohol use: Yes     Alcohol/week: 12.0 standard drinks of alcohol     Types: 12 Cans of beer per week     Comment: 5 drinks per week     Family History   Problem Relation Age of Onset    Diabetes Maternal Grandfather     Irritable Bowel Syndrome Mother     Alcoholism Brother      History   Drug Use No         Review of Systems  Constitutional, HEENT, cardiovascular, pulmonary, gi and gu systems are negative, except as otherwise noted.    Objective    /88 (BP Location:  "Right arm, Patient Position: Sitting, Cuff Size: Adult Regular)   Pulse 69   Temp 97.8  F (36.6  C) (Tympanic)   Resp 20   Ht 1.759 m (5' 9.25\")   Wt 103 kg (227 lb)   SpO2 97%   BMI 33.28 kg/m     Estimated body mass index is 33.28 kg/m  as calculated from the following:    Height as of this encounter: 1.759 m (5' 9.25\").    Weight as of this encounter: 103 kg (227 lb).  Physical Exam  GENERAL: alert and no distress  EYES: Eyes grossly normal to inspection, PERRL and conjunctivae and sclerae normal  HENT: ear canals and TM's normal, nose and mouth without ulcers or lesions  NECK: no adenopathy, no asymmetry, masses, or scars  RESP: lungs clear to auscultation - no rales, rhonchi or wheezes  CV: regular rate and rhythm, normal S1 S2, no S3 or S4, no murmur, click or rub, no peripheral edema  ABDOMEN: soft, nontender, no hepatosplenomegaly, no masses and bowel sounds normal  MS: no gross musculoskeletal defects noted, no edema  SKIN: no suspicious lesions or rashes  NEURO: Normal strength and tone, mentation intact and speech normal  PSYCH: mentation appears normal, affect normal/bright    Recent Labs   Lab Test 05/21/25  1137 05/15/25  0837 11/08/24  1031   HGB  --  15.2 15.0   PLT  --  217 177   NA  --  141 138   POTASSIUM  --  4.1 4.0   CR  --  1.10 1.13   A1C 5.1  --  5.1        Diagnostics  Recent Results (from the past 24 hours)   Basic metabolic panel    Collection Time: 06/23/25  7:46 AM   Result Value Ref Range    Sodium 139 135 - 145 mmol/L    Potassium 4.4 3.4 - 5.3 mmol/L    Chloride 105 98 - 107 mmol/L    Carbon Dioxide (CO2) 24 22 - 29 mmol/L    Anion Gap 10 7 - 15 mmol/L    Urea Nitrogen 12.2 6.0 - 20.0 mg/dL    Creatinine 1.13 0.67 - 1.17 mg/dL    GFR Estimate 86 >60 mL/min/1.73m2    Calcium 9.5 8.8 - 10.4 mg/dL    Glucose 102 (H) 70 - 99 mg/dL   CBC with platelets and differential    Collection Time: 06/23/25  7:46 AM   Result Value Ref Range    WBC Count 6.0 4.0 - 11.0 10e3/uL    RBC Count " 4.88 4.40 - 5.90 10e6/uL    Hemoglobin 15.8 13.3 - 17.7 g/dL    Hematocrit 44.5 40.0 - 53.0 %    MCV 91 78 - 100 fL    MCH 32.4 26.5 - 33.0 pg    MCHC 35.5 31.5 - 36.5 g/dL    RDW 12.2 10.0 - 15.0 %    Platelet Count 194 150 - 450 10e3/uL    % Neutrophils 51 %    % Lymphocytes 39 %    % Monocytes 7 %    % Eosinophils 3 %    % Basophils 1 %    % Immature Granulocytes 0 %    NRBCs per 100 WBC 0 <1 /100    Absolute Neutrophils 3.1 1.6 - 8.3 10e3/uL    Absolute Lymphocytes 2.3 0.8 - 5.3 10e3/uL    Absolute Monocytes 0.4 0.0 - 1.3 10e3/uL    Absolute Eosinophils 0.2 0.0 - 0.7 10e3/uL    Absolute Basophils 0.0 0.0 - 0.2 10e3/uL    Absolute Immature Granulocytes 0.0 <=0.4 10e3/uL    Absolute NRBCs 0.0 10e3/uL      No EKG this visit, completed in the last 90 days.    Revised Cardiac Risk Index (RCRI)  The patient has the following serious cardiovascular risks for perioperative complications:   - No serious cardiac risks = 0 points     RCRI Interpretation: 0 points: Class I (very low risk - 0.4% complication rate)         Signed Electronically by: Luis Daniel Salinas MD  A copy of this evaluation report is provided to the requesting physician.

## 2025-06-23 NOTE — PATIENT INSTRUCTIONS
How to Take Your Medication Before Surgery  Preoperative Medication Instructions   Antiplatelet or Anticoagulation Medication Instructions   - We reviewed the medication list and the patient is not on an antiplatelet or anticoagulation medications.    Additional Medication Instructions   - Herbal medications and vitamins: DO NOT TAKE 14 days prior to surgery.   - ibuprofen (Advil, Motrin): DO NOT TAKE 1 day before surgery.        Patient Education   Preparing for Your Surgery  For Adults  Getting started  In most cases, a nurse will call to review your health history and instructions. They will give you an arrival time based on your scheduled surgery time. Please be ready to share:  Your doctor's clinic name and phone number  Your medical, surgical, and anesthesia history  A list of allergies and sensitivities  A list of medicines, including herbal treatments and over-the-counter drugs  Whether the patient has a legal guardian (ask how to send us the papers in advance)  Note: You may not receive a call if you were seen at our PAC (Preoperative Assessment Center).  Please tell us if you're pregnant--or if there's any chance you might be pregnant. Some surgeries may injure a fetus (unborn baby), so they require a pregnancy test. Surgeries that are safe for a fetus don't always need a test, and you can choose whether to have one.   Preparing for surgery  Within 10 to 30 days of surgery: Have a pre-op exam (sometimes called an H&P, or History and Physical). This can be done at a clinic or pre-operative center.  If you're having a , you may not need this exam. Talk to your care team.  At your pre-op exam, talk to your care team about all medicines you take. (This includes CBD oil and any drugs, such as THC, marijuana, and other forms of cannabis.) If you need to stop any medicine before surgery, ask when to start taking it again.  This is for your safety. Many medicines and drugs can make you bleed too much  during surgery. Some change how well surgery (anesthesia) drugs work.  Call your insurance company to let them know you're having surgery. (If you don't have insurance, call [phone].)  Call your clinic if there's any change in your health. This includes a scrape or scratch near the surgery site, or any signs of a cold (sore throat, runny nose, cough, rash, fever).  Eating and drinking guidelines  For your safety: Unless your surgeon tells you otherwise, follow the guidelines below.  Eat and drink as normal until 8 hours before you arrive for surgery. After that, no food or milk. You can spit out gum when you arrive.  Drink clear liquids until 2 hours before you arrive. These are liquids you can see through, like water, Gatorade, and Propel Water. They also include plain black coffee and tea (no cream or milk).  No alcohol for 24 hours before you arrive. The night before surgery, stop any drinks that contain THC.  If your care team tells you to take medicine on the morning of surgery, it's okay to take it with a sip of water. No other medicines or drugs are allowed (including CBD oil)--follow your care team's instructions.  If you have questions the day of surgery, call your hospital or surgery center.   Preventing infection  Shower or bathe the night before and the morning of surgery. Follow the instructions your clinic gave you. (If no instructions, use regular soap.)  Don't shave or clip hair near your surgery site. We'll remove the hair if needed.  Don't smoke or vape the morning of surgery. No chewing tobacco for 6 hours before you arrive. A nicotine patch is okay. You may spit out nicotine gum when you arrive.  For some surgeries, the surgeon will tell you to fully quit smoking and nicotine.  We will make every effort to keep you safe from infection. We will:  Clean our hands often with soap and water (or an alcohol-based hand rub).  Clean the skin at your surgery site with a special soap that kills  germs.  Give you a special gown to keep you warm. (Cold raises the risk of infection.)  Wear hair covers, masks, gowns, and gloves during surgery.  Give antibiotic medicine, if prescribed. Not all surgeries need this medicine.  What to bring on the day of surgery  Photo ID and insurance card  Copy of your health care directive, if you have one  Glasses and hearing aids (bring cases)  You can't wear contacts during surgery  Inhaler and eye drops, if you use them (tell us about these when you arrive)  CPAP machine or breathing device, if you use them  A few personal items, if spending the night  If you have . . .  A pacemaker, ICD (cardiac defibrillator), or other implant: Bring the ID card.  An implanted stimulator: Bring the remote control.  A legal guardian: Bring a copy of the certified (court-stamped) guardianship papers.  Please remove any jewelry, including body piercings. Leave jewelry and other valuables at home.  If you're going home the day of surgery  You must have a support person drive you home. They should stay with you overnight, and they may need to help with your self-care.  If you don't have a support person, please tells us as soon as possible. We can help.  After surgery  If it's hard to control your pain or you need more pain medicine, please call your surgeon's office.  Questions?   If you have any questions for your care team, list them here:   ____________________________________________________________________________________________________________________________________________________________________________________________________________________________________________________________  For informational purposes only. Not to replace the advice of your health care provider. Copyright   2003, 2019 [facility]. All rights reserved. Clinically reviewed by Freddie Velázquez MD. [employer] [id number] - REV 02/25.

## 2025-06-25 ENCOUNTER — HOSPITAL ENCOUNTER (OUTPATIENT)
Facility: HOSPITAL | Age: 35
Discharge: HOME | End: 2025-06-25
Attending: UROLOGY | Admitting: UROLOGY
Payer: COMMERCIAL

## 2025-06-25 ENCOUNTER — ANESTHESIA (OUTPATIENT)
Dept: SURGERY | Facility: HOSPITAL | Age: 35
End: 2025-06-25
Payer: COMMERCIAL

## 2025-06-25 VITALS
WEIGHT: 227 LBS | SYSTOLIC BLOOD PRESSURE: 148 MMHG | RESPIRATION RATE: 16 BRPM | BODY MASS INDEX: 33.62 KG/M2 | HEIGHT: 69 IN | HEART RATE: 76 BPM | DIASTOLIC BLOOD PRESSURE: 110 MMHG | OXYGEN SATURATION: 99 % | TEMPERATURE: 97.2 F

## 2025-06-25 DIAGNOSIS — Z30.2 ENCOUNTER FOR VASECTOMY: Primary | ICD-10-CM

## 2025-06-25 PROCEDURE — 360N000074 HC SURGERY LEVEL 1, PER MIN: Performed by: UROLOGY

## 2025-06-25 PROCEDURE — 250N000009 HC RX 250: Performed by: NURSE ANESTHETIST, CERTIFIED REGISTERED

## 2025-06-25 PROCEDURE — 55250 REMOVAL OF SPERM DUCT(S): CPT | Performed by: UROLOGY

## 2025-06-25 PROCEDURE — 999N000141 HC STATISTIC PRE-PROCEDURE NURSING ASSESSMENT: Performed by: UROLOGY

## 2025-06-25 PROCEDURE — 258N000003 HC RX IP 258 OP 636: Performed by: NURSE PRACTITIONER

## 2025-06-25 PROCEDURE — 250N000009 HC RX 250: Performed by: UROLOGY

## 2025-06-25 PROCEDURE — 710N000012 HC RECOVERY PHASE 2, PER MINUTE: Performed by: UROLOGY

## 2025-06-25 PROCEDURE — 250N000011 HC RX IP 250 OP 636: Performed by: NURSE ANESTHETIST, CERTIFIED REGISTERED

## 2025-06-25 PROCEDURE — 272N000001 HC OR GENERAL SUPPLY STERILE: Performed by: UROLOGY

## 2025-06-25 PROCEDURE — 370N000017 HC ANESTHESIA TECHNICAL FEE, PER MIN: Performed by: UROLOGY

## 2025-06-25 RX ORDER — GINSENG 100 MG
CAPSULE ORAL
Status: DISCONTINUED
Start: 2025-06-25 | End: 2025-06-25 | Stop reason: HOSPADM

## 2025-06-25 RX ORDER — IBUPROFEN 800 MG/1
800 TABLET, FILM COATED ORAL EVERY 8 HOURS PRN
Qty: 40 TABLET | Refills: 1 | Status: SHIPPED | OUTPATIENT
Start: 2025-06-25

## 2025-06-25 RX ORDER — LIDOCAINE HYDROCHLORIDE 20 MG/ML
INJECTION, SOLUTION INFILTRATION; PERINEURAL
Status: DISCONTINUED
Start: 2025-06-25 | End: 2025-06-25 | Stop reason: HOSPADM

## 2025-06-25 RX ORDER — LIDOCAINE HYDROCHLORIDE 20 MG/ML
INJECTION, SOLUTION INFILTRATION; PERINEURAL PRN
Status: DISCONTINUED | OUTPATIENT
Start: 2025-06-25 | End: 2025-06-25 | Stop reason: HOSPADM

## 2025-06-25 RX ORDER — SODIUM CHLORIDE, SODIUM LACTATE, POTASSIUM CHLORIDE, CALCIUM CHLORIDE 600; 310; 30; 20 MG/100ML; MG/100ML; MG/100ML; MG/100ML
INJECTION, SOLUTION INTRAVENOUS CONTINUOUS
Status: DISCONTINUED | OUTPATIENT
Start: 2025-06-25 | End: 2025-06-25 | Stop reason: HOSPADM

## 2025-06-25 RX ORDER — BACITRACIN ZINC 500 [USP'U]/G
OINTMENT TOPICAL PRN
Status: DISCONTINUED | OUTPATIENT
Start: 2025-06-25 | End: 2025-06-25 | Stop reason: HOSPADM

## 2025-06-25 RX ORDER — PROPOFOL 10 MG/ML
INJECTION, EMULSION INTRAVENOUS CONTINUOUS PRN
Status: DISCONTINUED | OUTPATIENT
Start: 2025-06-25 | End: 2025-06-25

## 2025-06-25 RX ORDER — LIDOCAINE HYDROCHLORIDE 20 MG/ML
INJECTION, SOLUTION INFILTRATION; PERINEURAL PRN
Status: DISCONTINUED | OUTPATIENT
Start: 2025-06-25 | End: 2025-06-25

## 2025-06-25 RX ORDER — LIDOCAINE 40 MG/G
CREAM TOPICAL
Status: DISCONTINUED | OUTPATIENT
Start: 2025-06-25 | End: 2025-06-25 | Stop reason: HOSPADM

## 2025-06-25 RX ORDER — ONDANSETRON 2 MG/ML
INJECTION INTRAMUSCULAR; INTRAVENOUS PRN
Status: DISCONTINUED | OUTPATIENT
Start: 2025-06-25 | End: 2025-06-25

## 2025-06-25 RX ORDER — KETAMINE HYDROCHLORIDE 10 MG/ML
INJECTION INTRAMUSCULAR; INTRAVENOUS PRN
Status: DISCONTINUED | OUTPATIENT
Start: 2025-06-25 | End: 2025-06-25

## 2025-06-25 RX ORDER — PROPOFOL 10 MG/ML
INJECTION, EMULSION INTRAVENOUS PRN
Status: DISCONTINUED | OUTPATIENT
Start: 2025-06-25 | End: 2025-06-25

## 2025-06-25 RX ORDER — FENTANYL CITRATE 50 UG/ML
INJECTION, SOLUTION INTRAMUSCULAR; INTRAVENOUS PRN
Status: DISCONTINUED | OUTPATIENT
Start: 2025-06-25 | End: 2025-06-25

## 2025-06-25 RX ADMIN — Medication 30 MG: at 08:11

## 2025-06-25 RX ADMIN — FENTANYL CITRATE 50 MCG: 50 INJECTION INTRAMUSCULAR; INTRAVENOUS at 08:16

## 2025-06-25 RX ADMIN — SODIUM CHLORIDE, SODIUM LACTATE, POTASSIUM CHLORIDE, AND CALCIUM CHLORIDE: .6; .31; .03; .02 INJECTION, SOLUTION INTRAVENOUS at 07:48

## 2025-06-25 RX ADMIN — ONDANSETRON 4 MG: 2 INJECTION INTRAMUSCULAR; INTRAVENOUS at 08:14

## 2025-06-25 RX ADMIN — MIDAZOLAM 2 MG: 1 INJECTION INTRAMUSCULAR; INTRAVENOUS at 07:57

## 2025-06-25 RX ADMIN — FENTANYL CITRATE 50 MCG: 50 INJECTION INTRAMUSCULAR; INTRAVENOUS at 08:03

## 2025-06-25 RX ADMIN — PROPOFOL 100 MG: 10 INJECTION, EMULSION INTRAVENOUS at 08:06

## 2025-06-25 RX ADMIN — LIDOCAINE HYDROCHLORIDE 80 MG: 20 INJECTION, SOLUTION INFILTRATION; PERINEURAL at 08:06

## 2025-06-25 RX ADMIN — PROPOFOL 100 MCG/KG/MIN: 10 INJECTION, EMULSION INTRAVENOUS at 08:06

## 2025-06-25 ASSESSMENT — ACTIVITIES OF DAILY LIVING (ADL)
ADLS_ACUITY_SCORE: 15

## 2025-06-25 NOTE — ANESTHESIA CARE TRANSFER NOTE
Patient: Juan Blake    Procedure: Procedure(s):  VASECTOMY       Diagnosis: Request for sterilization [Z30.2]  Diagnosis Additional Information: No value filed.    Anesthesia Type:   MAC     Note:    Oropharynx: spontaneously breathing  Level of Consciousness: awake and drowsy  Oxygen Supplementation: room air    Independent Airway: airway patency satisfactory and stable  Dentition: dentition unchanged  Vital Signs Stable: post-procedure vital signs reviewed and stable  Report to RN Given: handoff report given  Patient transferred to: Phase II    Handoff Report: Identifed the Patient, Identified the Reponsible Provider, Reviewed the pertinent medical history, Discussed the surgical course, Reviewed Intra-OP anesthesia mangement and issues during anesthesia, Set expectations for post-procedure period and Allowed opportunity for questions and acknowledgement of understanding  Vitals:  Vitals Value Taken Time   /102 06/25/25 08:40   Temp     Pulse 80 06/25/25 08:40   Resp     SpO2 96 % 06/25/25 08:41   Vitals shown include unfiled device data.    Electronically Signed By: SYDNI Woods CRNA  June 25, 2025  8:42 AM

## 2025-06-25 NOTE — OP NOTE
[facility] Operative Note    Pre-operative diagnosis: Request for sterilization [Z30.2]   Post-operative diagnosis same   Procedure: VASECTOMY - Bilateral   Surgeon: Carie Van MD   Assistants(s): None   Estimated blood loss: minimal    Specimens: None   Findings: Uncomplicated vasectomy       Indication for procedure: Desires vasectomy     Procedure in detail: Patient was identified and greeted in the preoperative holding area.  Consent was verified.  He was brought to the operating room and placed supine on the OR table.  MAC anesthesia was administered.  A timeout was performed.  He was prepped and draped in the standard fashion.  First, the left vas deferens was immobilized through the scrotal skin between thumb and forefinger.  2% plain lidocaine was injected.  A small incision was made using a 15 blade scalpel.  A sharp dissector was used to dissect down onto the vas, which was then grasped with a vas clamp.  Cautery was used for any bleeding and to open the sheath on the vas, which was then grasped with a forceps.  The vas was dissected free.  Clamps were placed proximally and distally.  A segment was removed sharply.  The cut ends of the vas were cauterized.  Clips were placed proximally and distally. Fascial interposition was performed as the ends were allowed to retract into the scrotum.  3-0 vicryl interrupted sutures were placed in the skin.  The procedure was repeated on the contralateral side in a similar fashion without difficulty.  Bacitracin, gauze, and scrotal support were placed.  The patient was awakened and taken to recovery.    Plan: Discharge home today.  Semen analysis in 3 months.

## 2025-06-25 NOTE — PROGRESS NOTES
Patient and responsible adult given discharge instructions with no questions regarding instructions. Jonny score 19. Pain level 0/10.  Discharged from unit via ambulating . Patient discharged to home .

## 2025-06-25 NOTE — ANESTHESIA POSTPROCEDURE EVALUATION
Patient: Juan Blake    Procedure: Procedure(s):  VASECTOMY       Anesthesia Type:  MAC    Note:  Disposition: Outpatient   Postop Pain Control: Uneventful            Sign Out: Well controlled pain   PONV: No   Neuro/Psych: Uneventful            Sign Out: Acceptable/Baseline neuro status   Airway/Respiratory: Uneventful            Sign Out: Acceptable/Baseline resp. status   CV/Hemodynamics: Uneventful            Sign Out: Acceptable CV status; No obvious hypovolemia; No obvious fluid overload   Other NRE: NONE   DID A NON-ROUTINE EVENT OCCUR? No       Last vitals:  Vitals Value Taken Time   /111 06/25/25 09:15   Temp     Pulse 85 06/25/25 09:15   Resp 16 06/25/25 09:05   SpO2 98 % 06/25/25 09:15   Vitals shown include unfiled device data.    Electronically Signed By: SYDNI Woods CRNA  June 25, 2025  9:33 AM

## 2025-07-02 ENCOUNTER — DOCUMENTATION ONLY (OUTPATIENT)
Dept: UROLOGY | Facility: OTHER | Age: 35
End: 2025-07-02

## 2025-07-02 NOTE — PROGRESS NOTES
Pt requested letter for return to work, sent letter via scan to email for pt. Will place in scanning once pt receives letter in email.   Thank you,     Dimple GRAF, BSN, PHN  RN Care Coordinator Urology  [facility]

## 2025-07-02 NOTE — LETTER
[facility] - [place]  [address]  [place] [address]  [phone]          July 2, 2025    RE:  Juan Blake                                                                                                                                                       [facility] [address] [place] [address]            To whom it may concern:    Juan Blake is under my professional care for post urological procedure. He  may return to work on July 10th, 2025.    When the patient returns to work, he will be without restrictions.    Per Dr. Rehan WALDRON in urology.    Thank you,     Dimple GRAF BSN, PHN  RN Care Coordinator Urology  [employer]

## (undated) DEVICE — Device

## (undated) DEVICE — GLV-6.5 PROTEXIS PI CLASSIC LF/PF

## (undated) DEVICE — PACK LAPAROTOMY CUSTOM SBA32LPMBG

## (undated) DEVICE — DRSG KERLIX SUPER SPONGE 6X6.75" 2585

## (undated) DEVICE — SOL WATER IRRIG 1000ML BOTTLE 2F7114

## (undated) DEVICE — COVER LIGHT HANDLE DISP STRL LB41

## (undated) DEVICE — SOL NACL 0.9% IRRIG 1000ML BOTTLE 2F7124

## (undated) DEVICE — PANTIES MESH LG/XLG 2PK 706M2

## (undated) DEVICE — ESU GROUND PAD ADULT W/CORD E7507

## (undated) DEVICE — LABEL STERILE PREPRINTED FOR OR FRRH01-2M

## (undated) DEVICE — PACK BASIN SET UP SUTCNBSBBA

## (undated) DEVICE — TRAY SKIN PREP POVIDONE/IODINE DYND70372

## (undated) DEVICE — SYR 10ML FINGER CONTROL W/O NDL 309695

## (undated) RX ORDER — FENTANYL CITRATE 50 UG/ML
INJECTION, SOLUTION INTRAMUSCULAR; INTRAVENOUS
Status: DISPENSED
Start: 2025-06-25

## (undated) RX ORDER — PROPOFOL 10 MG/ML
INJECTION, EMULSION INTRAVENOUS
Status: DISPENSED
Start: 2025-06-25

## (undated) RX ORDER — SEVOFLURANE 250 ML/250ML
LIQUID RESPIRATORY (INHALATION)
Status: DISPENSED
Start: 2025-06-25